# Patient Record
Sex: FEMALE | Race: OTHER | Employment: UNEMPLOYED | URBAN - NONMETROPOLITAN AREA
[De-identification: names, ages, dates, MRNs, and addresses within clinical notes are randomized per-mention and may not be internally consistent; named-entity substitution may affect disease eponyms.]

---
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
## 2022-03-30 ENCOUNTER — HOSPITAL ENCOUNTER (OUTPATIENT)
Age: 22
Setting detail: SPECIMEN
Discharge: HOME | End: 2022-03-30

## 2022-03-30 ENCOUNTER — OFFICE VISIT (OUTPATIENT)
Dept: OBGYN CLINIC | Age: 22
End: 2022-03-30
Payer: COMMERCIAL

## 2022-03-30 VITALS — BODY MASS INDEX: 17.6 KG/M2 | SYSTOLIC BLOOD PRESSURE: 119 MMHG | DIASTOLIC BLOOD PRESSURE: 69 MMHG | WEIGHT: 96.2 LBS

## 2022-03-30 DIAGNOSIS — N94.6 DYSMENORRHEA: Primary | ICD-10-CM

## 2022-03-30 DIAGNOSIS — N94.6 DYSMENORRHEA: ICD-10-CM

## 2022-03-30 PROCEDURE — G8484 FLU IMMUNIZE NO ADMIN: HCPCS | Performed by: NURSE PRACTITIONER

## 2022-03-30 PROCEDURE — 99213 OFFICE O/P EST LOW 20 MIN: CPT | Performed by: NURSE PRACTITIONER

## 2022-03-30 PROCEDURE — G8427 DOCREV CUR MEDS BY ELIG CLIN: HCPCS | Performed by: NURSE PRACTITIONER

## 2022-03-30 PROCEDURE — G8419 CALC BMI OUT NRM PARAM NOF/U: HCPCS | Performed by: NURSE PRACTITIONER

## 2022-03-30 PROCEDURE — 1036F TOBACCO NON-USER: CPT | Performed by: NURSE PRACTITIONER

## 2022-03-30 RX ORDER — MEDROXYPROGESTERONE ACETATE 150 MG/ML
150 INJECTION, SUSPENSION INTRAMUSCULAR ONCE
Qty: 1 ML | Refills: 0 | Status: SHIPPED | OUTPATIENT
Start: 2022-03-30 | End: 2022-06-23

## 2022-03-30 ASSESSMENT — ENCOUNTER SYMPTOMS
SHORTNESS OF BREATH: 0
GASTROINTESTINAL NEGATIVE: 1
CHEST TIGHTNESS: 0

## 2022-03-30 NOTE — PROGRESS NOTES
PROBLEM VISIT     Date of service: 3/30/2022    Eulogio Andrews  Is a 25 y.o. female    PT's PCP is: No primary care provider on file. : 2000                                             Subjective:       Patient's last menstrual period was 2022 (approximate). OB History    Para Term  AB Living   1 1 1     1   SAB IAB Ectopic Molar Multiple Live Births           0 1      # Outcome Date GA Lbr Hugo/2nd Weight Sex Delivery Anes PTL Lv   1 Term 21 39w0d 02:14  00:41 6 lb 8.7 oz (2.968 kg) F Vag-Spont EPI N INDIA        Social History     Tobacco Use   Smoking Status Never Smoker   Smokeless Tobacco Never Used        Social History     Substance and Sexual Activity   Alcohol Use Never       Allergies: Patient has no known allergies. Current Outpatient Medications:     medroxyPROGESTERone (DEPO-PROVERA) 150 MG/ML injection, Inject 1 mL into the muscle once for 1 dose, Disp: 1 mL, Rfl: 0    Social History     Substance and Sexual Activity   Sexual Activity Yes    Partners: Male     Chief Complaint   Patient presents with    Dysmenorrhea     Reports severe cramping with menses- even worse now after pregnancy. Discuss depo. PE:  Vital Signs  Blood pressure 119/69, weight (!) 96 lb 3.2 oz (43.6 kg), last menstrual period 2022, not currently breastfeeding. HPI: Patient presents today with complaints of painful cramping for the first several days of menses since pregnancy. Reports monthly menses. States she is most interested in Depo for cycle control and used Depo briefly in the past.     PT denies fever, chills, nausea and vomiting       Review of Systems   Constitutional: Negative. Respiratory: Negative for chest tightness and shortness of breath. Cardiovascular: Negative for chest pain. Gastrointestinal: Negative. Genitourinary: Positive for menstrual problem. Negative for dysuria, frequency, pelvic pain, vaginal bleeding and vaginal discharge. Neurological: Negative. Psychiatric/Behavioral: Negative. Physical Exam  Constitutional:       Appearance: Normal appearance. HENT:      Head: Normocephalic. Cardiovascular:      Rate and Rhythm: Normal rate. Pulmonary:      Effort: Pulmonary effort is normal.      Breath sounds: Normal breath sounds. Musculoskeletal:         General: Normal range of motion. Neurological:      General: No focal deficit present. Mental Status: She is alert. Psychiatric:         Mood and Affect: Mood normal.         Behavior: Behavior normal.         Assessment and Plan          Diagnosis Orders   1. Dysmenorrhea  hCG, Quantitative, Pregnancy    medroxyPROGESTERone (DEPO-PROVERA) 150 MG/ML injection     Reports painful menses, desires to discuss treatment options. Reviewed options available including OCP's, patch, NuvaRing, Nexplanon, Depo, IUD. Patient desires Depo. She denies risk factors for use. Reviewed risks/benefits, administration, common side effects and bleeding patterns, aches. With negative hcg will return tomorrow for Depo injection. I have discontinued Elisha Valle's Prenatal Vitamin, ferrous sulfate, and norelgestromin-ethinyl estradiol. I am also having her start on medroxyPROGESTERone. Return for Depo tomorrow . There are no Patient Instructions on file for this visit. Over 50% of time spent on counseling and care coordination on: see assessment and plan,  She was also counseled on her preventative health maintenance recommendations and follow-up.         FF time:  20 min      DOROTHY Vidal NP,3/30/2022 2:33 PM

## 2022-03-31 ENCOUNTER — NURSE ONLY (OUTPATIENT)
Dept: OBGYN CLINIC | Age: 22
End: 2022-03-31
Payer: COMMERCIAL

## 2022-03-31 DIAGNOSIS — N94.6 DYSMENORRHEA: Primary | ICD-10-CM

## 2022-03-31 DIAGNOSIS — Z30.42 ENCOUNTER FOR MANAGEMENT AND INJECTION OF DEPO-PROVERA: ICD-10-CM

## 2022-03-31 DIAGNOSIS — N92.6 IRREGULAR MENSES: ICD-10-CM

## 2022-03-31 LAB — HCG QUANTITATIVE: <1 MIU/ML

## 2022-03-31 PROCEDURE — 96372 THER/PROPH/DIAG INJ SC/IM: CPT | Performed by: NURSE PRACTITIONER

## 2022-03-31 RX ORDER — MEDROXYPROGESTERONE ACETATE 150 MG/ML
150 INJECTION, SUSPENSION INTRAMUSCULAR ONCE
Status: COMPLETED | OUTPATIENT
Start: 2022-03-31 | End: 2022-03-31

## 2022-03-31 RX ADMIN — MEDROXYPROGESTERONE ACETATE 150 MG: 150 INJECTION, SUSPENSION INTRAMUSCULAR at 11:37

## 2022-06-07 ENCOUNTER — TELEPHONE (OUTPATIENT)
Dept: OBGYN | Age: 22
End: 2022-06-07

## 2022-06-07 ENCOUNTER — HOSPITAL ENCOUNTER (OUTPATIENT)
Age: 22
Discharge: HOME | End: 2022-06-07
Payer: COMMERCIAL

## 2022-06-07 ENCOUNTER — OFFICE VISIT (OUTPATIENT)
Dept: OBGYN | Age: 22
End: 2022-06-07
Payer: COMMERCIAL

## 2022-06-07 VITALS
WEIGHT: 99 LBS | BODY MASS INDEX: 18.22 KG/M2 | HEIGHT: 62 IN | SYSTOLIC BLOOD PRESSURE: 112 MMHG | DIASTOLIC BLOOD PRESSURE: 66 MMHG

## 2022-06-07 DIAGNOSIS — N92.1 MENORRHAGIA WITH IRREGULAR CYCLE: ICD-10-CM

## 2022-06-07 DIAGNOSIS — N92.1 MENORRHAGIA WITH IRREGULAR CYCLE: Primary | ICD-10-CM

## 2022-06-07 DIAGNOSIS — N92.6 IRREGULAR BLEEDING: Primary | ICD-10-CM

## 2022-06-07 DIAGNOSIS — N92.6 IRREGULAR BLEEDING: ICD-10-CM

## 2022-06-07 LAB — HCG QUANTITATIVE: <1 MIU/ML

## 2022-06-07 PROCEDURE — 36415 COLL VENOUS BLD VENIPUNCTURE: CPT

## 2022-06-07 PROCEDURE — 87591 N.GONORRHOEAE DNA AMP PROB: CPT

## 2022-06-07 PROCEDURE — 1036F TOBACCO NON-USER: CPT | Performed by: ADVANCED PRACTICE MIDWIFE

## 2022-06-07 PROCEDURE — 84702 CHORIONIC GONADOTROPIN TEST: CPT

## 2022-06-07 PROCEDURE — 87491 CHLMYD TRACH DNA AMP PROBE: CPT

## 2022-06-07 PROCEDURE — 99214 OFFICE O/P EST MOD 30 MIN: CPT | Performed by: ADVANCED PRACTICE MIDWIFE

## 2022-06-07 PROCEDURE — G8427 DOCREV CUR MEDS BY ELIG CLIN: HCPCS | Performed by: ADVANCED PRACTICE MIDWIFE

## 2022-06-07 PROCEDURE — G8419 CALC BMI OUT NRM PARAM NOF/U: HCPCS | Performed by: ADVANCED PRACTICE MIDWIFE

## 2022-06-07 NOTE — PROGRESS NOTES
PROBLEM VISIT     Date of service: 2022    Christin Llamas  Is a 25 y.o. single female    PT's PCP is: No primary care provider on file. : 2000                                             Subjective:       No LMP recorded. (Menstrual status: Irregular periods). OB History    Para Term  AB Living   1 1 1     1   SAB IAB Ectopic Molar Multiple Live Births           0 1      # Outcome Date GA Lbr Hugo/2nd Weight Sex Delivery Anes PTL Lv   1 Term 21 39w0d 02:14  00:41 6 lb 8.7 oz (2.968 kg) F Vag-Spont EPI N INDIA        Social History     Tobacco Use   Smoking Status Never Smoker   Smokeless Tobacco Never Used        Social History     Substance and Sexual Activity   Alcohol Use Never       Allergies: Patient has no known allergies. Current Outpatient Medications:     medroxyPROGESTERone (DEPO-PROVERA) 150 MG/ML injection, Inject 1 mL into the muscle once for 1 dose, Disp: 1 mL, Rfl: 0    Social History     Substance and Sexual Activity   Sexual Activity Yes    Partners: Male    Birth control/protection: Injection       Last Yearly:  never    Last pap: never    Last HPV: never    Have you had a positive covid test: Yes    Have you had the covid immunization: Yes    Chief Complaint   Patient presents with    Menstrual Problem     Patient has concerns with irregular bleeding passed  golf ball sized tissue. Abdominal pain. Patient receives Depo Provera, last injection  2022. Patient had negative  serum HCG today. PE:  Vital Signs  Blood pressure 112/66, height 5' 2\" (1.575 m), weight (!) 99 lb (44.9 kg), not currently breastfeeding. Estimated body mass index is 18.11 kg/m² as calculated from the following:    Height as of this encounter: 5' 2\" (1.575 m). Weight as of this encounter: 99 lb (44.9 kg). No data recorded    NURSE:CLAUS Orta LPN    HPI: patient is scheduled as yearly exam but actually here for irregular bleeding on depo provera, passed large clot     PT denies fever, chills, nausea and vomiting       Objective   No acute distress  Excellent communications  Well-nourished  Lymphatic:   no lymphadenopathy   GI: Abdomen soft, non-tender. BS normal. No masses,  No organomegaly           Extremities: normal strength, tone, and muscle mass      Pelvic Exam: GENITAL/URINARY:  External Genitalia:  General appearance; normal, Hair distribution; normal, Lesions absent  Urethral Meatus:  Size normal, Location normal, Lesions absent, Prolapse absent  Urethra: Fullness absent, Masses absent  Bladder:  Fullness absent, Masses absent, Tenderness absent, Cystocele absent  Vagina:  General appearance normal, Estrogen effect normal, Discharge old blood in vault, Lesions absent, Pelvic support normal  Cervix:  General appearance normal, Lesions absent, Discharge absent, Tenderness absent, Enlargement absent, Nodularity absent  Uterus:  Size normal, Tenderness absent  Adenexa: Masses absent, Tenderness absent  Anus/Perineum:  Lesions absent and Masses absent                                                       Results reviewed today:    No results found for this visit on 06/07/22. Assessment and Plan          Diagnosis Orders   1. Menorrhagia with irregular cycle  C.trachomatis N.gonorrhoeae DNA    US NON OB TRANSVAGINAL       await cultures and sono, continue depo therapy dependent on results      I am having Elisha JOINER Chel Story maintain her medroxyPROGESTERone. Return for gyn US for heavy bleeding asap here in office. There are no Patient Instructions on file for this visit.     Time spent 30 minutes      Julio Cesar Salguero, [address] 10:54 AM

## 2022-06-08 LAB
C TRACH DNA GENITAL QL NAA+PROBE: NEGATIVE
N. GONORRHOEAE DNA: NEGATIVE
SPECIMEN DESCRIPTION: NORMAL

## 2022-06-23 ENCOUNTER — NURSE ONLY (OUTPATIENT)
Dept: OBGYN CLINIC | Age: 22
End: 2022-06-23
Payer: COMMERCIAL

## 2022-06-23 DIAGNOSIS — N94.6 DYSMENORRHEA: ICD-10-CM

## 2022-06-23 DIAGNOSIS — N92.6 IRREGULAR MENSES: Primary | ICD-10-CM

## 2022-06-23 PROCEDURE — 96372 THER/PROPH/DIAG INJ SC/IM: CPT | Performed by: NURSE PRACTITIONER

## 2022-06-23 RX ORDER — MEDROXYPROGESTERONE ACETATE 150 MG/ML
150 INJECTION, SUSPENSION INTRAMUSCULAR ONCE
Status: COMPLETED | OUTPATIENT
Start: 2022-06-23 | End: 2022-06-23

## 2022-06-23 RX ORDER — MEDROXYPROGESTERONE ACETATE 150 MG/ML
150 INJECTION, SUSPENSION INTRAMUSCULAR ONCE
Qty: 1 ML | Refills: 0 | Status: SHIPPED | OUTPATIENT
Start: 2022-06-23 | End: 2022-06-30

## 2022-06-23 RX ADMIN — MEDROXYPROGESTERONE ACETATE 150 MG: 150 INJECTION, SUSPENSION INTRAMUSCULAR at 13:00

## 2022-06-30 ENCOUNTER — OFFICE VISIT (OUTPATIENT)
Dept: OBGYN | Age: 22
End: 2022-06-30
Payer: COMMERCIAL

## 2022-06-30 VITALS
DIASTOLIC BLOOD PRESSURE: 60 MMHG | WEIGHT: 98.6 LBS | BODY MASS INDEX: 18.14 KG/M2 | HEIGHT: 62 IN | SYSTOLIC BLOOD PRESSURE: 116 MMHG

## 2022-06-30 DIAGNOSIS — N94.89 PELVIC CONGESTION: ICD-10-CM

## 2022-06-30 DIAGNOSIS — Q50.5 PARA-OVARIAN CYST: Primary | ICD-10-CM

## 2022-06-30 PROCEDURE — 1036F TOBACCO NON-USER: CPT | Performed by: ADVANCED PRACTICE MIDWIFE

## 2022-06-30 PROCEDURE — 99212 OFFICE O/P EST SF 10 MIN: CPT | Performed by: ADVANCED PRACTICE MIDWIFE

## 2022-06-30 PROCEDURE — G8419 CALC BMI OUT NRM PARAM NOF/U: HCPCS | Performed by: ADVANCED PRACTICE MIDWIFE

## 2022-06-30 PROCEDURE — G8427 DOCREV CUR MEDS BY ELIG CLIN: HCPCS | Performed by: ADVANCED PRACTICE MIDWIFE

## 2022-06-30 NOTE — PROGRESS NOTES
PROBLEM VISIT     Date of service: 2022    Carlos Keller  Is a 25 y.o. single female    PT's PCP is: No primary care provider on file. : 2000                                             Subjective:       No LMP recorded (lmp unknown). (Menstrual status: Irregular periods). OB History    Para Term  AB Living   1 1 1     1   SAB IAB Ectopic Molar Multiple Live Births           0 1      # Outcome Date GA Lbr Hugo/2nd Weight Sex Delivery Anes PTL Lv   1 Term 21 39w0d 02:14  00:41 6 lb 8.7 oz (2.968 kg) F Vag-Spont EPI N [place]IA        Social History     Tobacco Use   Smoking Status Never Smoker   Smokeless Tobacco Never Used        Social History     Substance and Sexual Activity   Alcohol Use Never       Social History     Substance and Sexual Activity   Sexual Activity Yes    Partners: Male    Birth control/protection: Injection       Allergies: Patient has no known allergies. Chief Complaint   Patient presents with    Menstrual Problem     Follow up in house ultrasound, h/o heavy bleeding with clots. Patient recies Depo Provera. Last Yearly:  never    Last pap: never    Last HPV: never    Have you had a positive covid test: Yes    Have you had the covid immunization: Yes    PE:  Vital Signs  Blood pressure 116/60, height 5' 2\" (1.575 m), weight 98 lb 9.6 oz (44.7 kg), not currently breastfeeding. Estimated body mass index is 18.03 kg/m² as calculated from the following:    Height as of this encounter: 5' 2\" (1.575 m). Weight as of this encounter: 98 lb 9.6 oz (44.7 kg). No data recorded      NURSE: Negrita SILVA    HPI: patient is being w/u for heavy bleeding while on depo      PT denies fever, chills, nausea and vomiting       Objective: No acute distress  Excellent communications  Well-nourished    Results reviewed today:  Sono:  UTERUS:anteverted, homogeneous echo pattern; hypervascular uterus        ENDO:5mm in thickness       RT.  OVARY:seen, follicles visualized        LT. OVARY:seen, follicles visualized - para ovarian cysts visualized in    left adnexa    Finding 1 bob- 1.6cm x 1.5cm x 1.7cm   Finding 2 bob- 0.9cm x 0.9cm x 1.3cm       Prominent vessels visualized in uterus and out to both adnexal regions-    suggestive of pelvic congestion        No results found for this visit on 06/30/22. Assessment and Plan          Diagnosis Orders   1. Para-ovarian cyst     2. Pelvic congestion       Will continue depo and f/u paraovarian cyst / surveil with sono        I am having Elisha Altamirano maintain her medroxyPROGESTERone. No follow-ups on file. There are no Patient Instructions on file for this visit.     Time spent 20 minutes      DOROTHY Tello CNM,7/1/2022 4:31 PM

## 2022-09-15 ENCOUNTER — TELEPHONE (OUTPATIENT)
Dept: OBGYN CLINIC | Age: 22
End: 2022-09-15

## 2022-09-17 NOTE — TELEPHONE ENCOUNTER
Do we know what is going on here? Is the patient going to change and go to the [facility] office?

## 2023-02-02 ENCOUNTER — TELEPHONE (OUTPATIENT)
Dept: OBGYN | Age: 23
End: 2023-02-02

## 2023-02-02 NOTE — TELEPHONE ENCOUNTER
Patient calls with concerns of feeling very tired but also feels dizzy and blurred vision off and on. Patient is very early pregnant LMP first week of January. Advised patient to proceed to ED for further evaluation, patient voiced understanding.

## 2023-03-01 ENCOUNTER — INITIAL PRENATAL (OUTPATIENT)
Dept: OBGYN | Age: 23
End: 2023-03-01

## 2023-03-01 ENCOUNTER — HOSPITAL ENCOUNTER (OUTPATIENT)
Age: 23
Setting detail: SPECIMEN
Discharge: HOME | End: 2023-03-01
Payer: COMMERCIAL

## 2023-03-01 ENCOUNTER — ANCILLARY PROCEDURE (OUTPATIENT)
Dept: OBGYN | Age: 23
End: 2023-03-01
Payer: COMMERCIAL

## 2023-03-01 VITALS — SYSTOLIC BLOOD PRESSURE: 100 MMHG | BODY MASS INDEX: 18.47 KG/M2 | WEIGHT: 101 LBS | DIASTOLIC BLOOD PRESSURE: 64 MMHG

## 2023-03-01 DIAGNOSIS — Z32.01 POSITIVE PREGNANCY TEST: ICD-10-CM

## 2023-03-01 DIAGNOSIS — Z34.81 ENCOUNTER FOR SUPERVISION OF OTHER NORMAL PREGNANCY IN FIRST TRIMESTER: Primary | ICD-10-CM

## 2023-03-01 DIAGNOSIS — N91.2 AMENORRHEA: ICD-10-CM

## 2023-03-01 DIAGNOSIS — Z32.01 POSITIVE URINE PREGNANCY TEST: ICD-10-CM

## 2023-03-01 DIAGNOSIS — Z34.81 ENCOUNTER FOR SUPERVISION OF OTHER NORMAL PREGNANCY IN FIRST TRIMESTER: ICD-10-CM

## 2023-03-01 DIAGNOSIS — O36.80X0 ENCOUNTER TO DETERMINE FETAL VIABILITY OF PREGNANCY, SINGLE OR UNSPECIFIED FETUS: ICD-10-CM

## 2023-03-01 DIAGNOSIS — Z3A.08 8 WEEKS GESTATION OF PREGNANCY: ICD-10-CM

## 2023-03-01 LAB
ABO/RH: NORMAL
ABSOLUTE EOS #: 0.06 K/UL (ref 0–0.44)
ABSOLUTE IMMATURE GRANULOCYTE: <0.03 K/UL (ref 0–0.3)
ABSOLUTE LYMPH #: 1.83 K/UL (ref 1.2–5.2)
ABSOLUTE MONO #: 0.56 K/UL (ref 0.1–1.4)
AMPHETAMINE SCREEN URINE: NEGATIVE
ANTIBODY SCREEN: NEGATIVE
BARBITURATE SCREEN URINE: NEGATIVE
BASOPHILS # BLD: 0 % (ref 0–2)
BASOPHILS ABSOLUTE: 0.03 K/UL (ref 0–0.2)
BENZODIAZEPINE SCREEN, URINE: NEGATIVE
BUPRENORPHINE URINE: NEGATIVE
CANNABINOID SCREEN URINE: NEGATIVE
COCAINE METABOLITE, URINE: NEGATIVE
CONTROL: PRESENT
EOSINOPHILS RELATIVE PERCENT: 1 % (ref 1–4)
FENTANYL URINE: NEGATIVE
HBV SURFACE AG SER QL: NONREACTIVE
HCT VFR BLD AUTO: 38.7 % (ref 36.3–47.1)
HCV AB SER QL: NONREACTIVE
HGB BLD-MCNC: 13.2 G/DL (ref 11.9–15.1)
HIV 1+2 AB+HIV1 P24 AG SERPL QL IA: NONREACTIVE
IMMATURE GRANULOCYTES: 0 %
LYMPHOCYTES # BLD: 26 % (ref 25–45)
MCH RBC QN AUTO: 30.3 PG (ref 25.2–33.5)
MCHC RBC AUTO-ENTMCNC: 34.1 G/DL (ref 28.4–34.8)
MCV RBC AUTO: 88.8 FL (ref 82.6–102.9)
METHADONE SCREEN, URINE: NEGATIVE
MONOCYTES # BLD: 8 % (ref 2–8)
NRBC AUTOMATED: 0 PER 100 WBC
OPIATES, URINE: NEGATIVE
OXYCODONE SCREEN URINE: NEGATIVE
PDW BLD-RTO: 12.2 % (ref 11.8–14.4)
PHENCYCLIDINE, URINE: NEGATIVE
PLATELET # BLD AUTO: 316 K/UL (ref 138–453)
PMV BLD AUTO: 10.6 FL (ref 8.1–13.5)
PREGNANCY TEST URINE, POC: POSITIVE
RBC # BLD: 4.36 M/UL (ref 3.95–5.11)
RUBV IGG SER QL: 270.2 IU/ML
SEG NEUTROPHILS: 65 % (ref 34–64)
SEGMENTED NEUTROPHILS ABSOLUTE COUNT: 4.63 K/UL (ref 1.8–8)
T PALLIDUM AB SER QL IA: NONREACTIVE
WBC # BLD AUTO: 7.1 K/UL (ref 4.5–13.5)

## 2023-03-01 PROCEDURE — 86762 RUBELLA ANTIBODY: CPT

## 2023-03-01 PROCEDURE — 86901 BLOOD TYPING SEROLOGIC RH(D): CPT

## 2023-03-01 PROCEDURE — 87591 N.GONORRHOEAE DNA AMP PROB: CPT

## 2023-03-01 PROCEDURE — 80306 DRUG TEST PRSMV INSTRMNT: CPT

## 2023-03-01 PROCEDURE — 87340 HEPATITIS B SURFACE AG IA: CPT

## 2023-03-01 PROCEDURE — 86900 BLOOD TYPING SEROLOGIC ABO: CPT

## 2023-03-01 PROCEDURE — 86850 RBC ANTIBODY SCREEN: CPT

## 2023-03-01 PROCEDURE — 86780 TREPONEMA PALLIDUM: CPT

## 2023-03-01 PROCEDURE — 85025 COMPLETE CBC W/AUTO DIFF WBC: CPT

## 2023-03-01 PROCEDURE — 87389 HIV-1 AG W/HIV-1&-2 AB AG IA: CPT

## 2023-03-01 PROCEDURE — 86803 HEPATITIS C AB TEST: CPT

## 2023-03-01 PROCEDURE — 87491 CHLMYD TRACH DNA AMP PROBE: CPT

## 2023-03-01 PROCEDURE — 87086 URINE CULTURE/COLONY COUNT: CPT

## 2023-03-01 NOTE — PATIENT INSTRUCTIONS
Patient Education        Learning About Pregnancy  Your Care Instructions     Your health in the early weeks of your pregnancy is particularly important for your baby's health. Take good care of yourself. Anything you do that harms your body can also harm your baby. Make sure to go to all of your doctor appointments. Regular checkups will help keep you and your baby healthy. How can you care for yourself at home? Diet    Eat a balanced diet. Make sure your diet includes plenty of beans, peas, and leafy green vegetables. Do not skip meals or go for many hours without eating. If you are nauseated, try to eat a small, healthy snack every 2 to 3 hours. Do not eat fish that has a high level of mercury, such as shark, swordfish, or mackerel. Do not eat more than one can of tuna each week. Drink plenty of fluids. If you have kidney, heart, or liver disease and have to limit fluids, talk with your doctor before you increase the amount of fluids you drink. Cut down on caffeine, such as coffee, tea, and cola. Do not drink alcohol, such as beer, wine, or hard liquor. Take a multivitamin that contains at least 400 micrograms (mcg) of folic acid to help prevent birth defects. Fortified cereal and whole wheat bread are good additional sources of folic acid. Increase the calcium in your diet. Try to drink a quart of skim milk each day. You may also take calcium supplements and choose foods such as cheese and yogurt. Lifestyle    Make sure you go to your follow-up appointments. Get plenty of rest. You may be unusually tired while you are pregnant. Get at least 30 minutes of exercise on most days of the week. Walking is a good choice. If you have not exercised in the past, start out slowly. Take several short walks each day. Do not smoke. If you need help quitting, talk to your doctor about stop-smoking programs. These can increase your chances of quitting for good.      Do not touch cat feces or litter boxes. Also, wash your hands after you handle raw meat, and fully cook all meat before you eat it. Wear gloves when you work in the yard or garden, and wash your hands well when you are done. Cat feces, raw or undercooked meat, and contaminated dirt can cause an infection that may harm your baby or lead to a miscarriage. Avoid things that can make your body too hot and may be harmful to your baby, such as a hot tub or sauna. Or talk with your doctor before doing anything that raises your body temperature. Your doctor can tell you if it's safe. Avoid chemical fumes, paint fumes, or poisons. Do not use illegal drugs, marijuana, or alcohol. Medicines    Review all of your medicines with your doctor. Some of your routine medicines may need to be changed to protect your baby. Use acetaminophen (Tylenol) to relieve minor problems, such as a mild headache or backache or a mild fever with cold symptoms. Do not use nonsteroidal anti-inflammatory drugs (NSAIDs), such as ibuprofen (Advil, Motrin) or naproxen (Aleve), unless your doctor says it is okay. Do not take two or more pain medicines at the same time unless the doctor told you to. Many pain medicines have acetaminophen, which is Tylenol. Too much acetaminophen (Tylenol) can be harmful. Take your medicines exactly as prescribed. Call your doctor if you think you are having a problem with your medicine. To manage morning sickness    If you feel sick when you first wake up, try eating a small snack (such as crackers) before you get out of bed. Allow some time to digest the snack, and then get out of bed slowly. Do not skip meals or go for long periods without eating. An empty stomach can make nausea worse. Eat small, frequent meals instead of three large meals each day. Drink plenty of fluids. Eat foods that are high in protein but low in fat.      If you are taking iron supplements, ask your doctor if they are necessary. Iron can make nausea worse. Avoid any smells, such as coffee, that make you feel sick. Get lots of rest. Morning sickness may be worse when you are tired. Follow-up care is a key part of your treatment and safety. Be sure to make and go to all appointments, and call your doctor if you are having problems. It's also a good idea to know your test results and keep a list of the medicines you take. Where can you learn more? Go to http://www.woods.com/ and enter S915 to learn more about \"Learning About Pregnancy. \"  Current as of: February 23, 2022               Content Version: 13.5  © 6758-0169 [employer]. Care instructions adapted under license by [facility] ([facility]). If you have questions about a medical condition or this instruction, always ask your healthcare professional. [address] any warranty or liability for your use of this information. Patient Education        Managing Morning Sickness: Care Instructions  Overview     Morning sickness can be the toughest part of early pregnancy. Some people feel mildly sick to their stomach, and others are running to the bathroom. The good news? Morning sickness usually gets better in the second trimester. It's likely that your hormones are to blame for morning sickness. But you can do things to feel better, like changing what you eat, avoiding certain foods and smells, and asking your doctor about medicines you can try. Follow-up care is a key part of your treatment and safety. Be sure to make and go to all appointments, and call your doctor if you are having problems. It's also a good idea to know your test results and keep a list of the medicines you take. How can you care for yourself at home? Keep food in your stomach, but not too much at once. Your nausea may be worse if your stomach is empty. Eat five or six small meals a day instead of three large meals.   For morning nausea, eat a small snack, such as a couple of crackers or dry biscuits, before rising. Allow a few minutes for your stomach to settle before you get out of bed slowly. Drink plenty of fluids. If you have kidney, heart, or liver disease and have to limit fluids, talk with your doctor before you increase the amount of fluids you drink. Some women find that peppermint tea helps with nausea. Eat more protein, such as chicken, fish, lean meat, beans, nuts, and seeds. Eat carbohydrate foods, such as potatoes, whole-grain cereals, rice, and pasta. Avoid smells and foods that make you feel nauseated. Spicy or high-fat foods, citrus juice, milk, coffee, and tea with caffeine often make nausea worse. Do not drink alcohol. Do not smoke. Try not to be around others who smoke. If you need help quitting, talk to your doctor about stop-smoking programs and medicines. These can increase your chances of quitting for good. If you are taking iron supplements, ask your doctor if they are necessary. Iron can make nausea worse. Get lots of rest. Stress and fatigue can make your morning sickness worse. Ask your doctor about taking prescription medicine, or over-the-counter products such as vitamin B6, doxylamine, or manuel, to relieve your symptoms. Your doctor can tell you the doses that are safe for you. Take your prenatal vitamins at night on a full stomach. When should you call for help? Call 911 anytime you think you may need emergency care. For example, call if:    You passed out (lost consciousness). Call your doctor now or seek immediate medical care if:    You are sick to your stomach or cannot drink fluids. You have symptoms of dehydration, such as:  Dry eyes and a dry mouth. Passing only a little urine. Feeling thirstier than usual.     You are not able to keep down your medicine. You have pain in your belly or pelvis.    Watch closely for changes in your health, and be sure to contact your doctor if:    You do not get better as expected. Where can you learn more? Go to http://www.amanda.com/ and enter W450 to learn more about \"Managing Morning Sickness: Care Instructions. \"  Current as of: February 23, 2022               Content Version: 13.5  © 2006-2022 Healthwise, [employer]. Care instructions adapted under license by [facility] ([facility]). If you have questions about a medical condition or this instruction, always ask your healthcare professional. [address] any warranty or liability for your use of this information. Patient Education        Nutrition During Pregnancy: Care Instructions  Overview     Healthy eating when you are pregnant is important for you and your baby. It can help you feel well and have a successful pregnancy and delivery. During pregnancy your nutrition needs increase. Even if you have excellent eating habits, your doctor may recommend a multivitamin to make sure you get enough iron and folic acid. You may wonder how much weight you should gain. In general, if you were at a healthy weight before you became pregnant, then you should gain between 25 and 35 pounds. If you were overweight before pregnancy, then you'll likely be advised to gain 15 to 25 pounds. If you were underweight before pregnancy, then you'll probably be advised to gain 28 to 40 pounds. Your doctor will work with you to set a weight goal that is right for you. Gaining a healthy amount of weight helps you have a healthy baby. Follow-up care is a key part of your treatment and safety. Be sure to make and go to all appointments, and call your doctor if you are having problems. It's also a good idea to know your test results and keep a list of the medicines you take. How can you care for yourself at home? Eat plenty of fruits and vegetables. Include a variety of orange, yellow, and leafy dark-green vegetables every day. Choose whole-grain bread, cereal, and pasta.  Good choices include whole wheat bread, whole wheat pasta, brown rice, and oatmeal.  Get 4 or more servings of milk and milk products each day. Good choices include nonfat or low-fat milk, yogurt, and cheese. If you cannot eat milk products, you can get calcium from calcium-fortified products such as orange juice, soy milk, and tofu. Other non-milk sources of calcium include leafy green vegetables, such as broccoli, kale, mustard greens, turnip greens, bok dara, and brussels sprouts. If you eat meat, pick lower-fat types. Good choices include lean cuts of meat and chicken or turkey without the skin. Do not eat shark, swordfish, aries mackerel, or tilefish. They have high levels of mercury, which is dangerous to your baby. You can eat up to 12 ounces a week of fish or shellfish that have low mercury levels. Good choices include shrimp, wild salmon, pollock, and catfish. Limit some other types of fish, such as white (albacore) tuna, to 4 oz (0.1 kg) a week. Heat lunch meats (such as turkey, ham, or bologna) to 165°F before you eat them. This reduces your risk of getting sick from a kind of bacteria that can be found in lunch meats. Do not eat unpasteurized soft cheeses, such as brie, feta, fresh mozzarella, and blue cheese. They have a bacteria that could harm your baby. Limit caffeine. If you drink coffee or tea, have no more than 1 cup a day. Caffeine is also found in titus. Do not drink any alcohol. No amount of alcohol has been found to be safe during pregnancy. Do not diet or try to lose weight. For example, do not follow a low-carbohydrate diet. If you are overweight at the start of your pregnancy, your doctor will work with you to manage your weight gain. Tell your doctor about all vitamins and supplements you take. When should you call for help? Watch closely for changes in your health, and be sure to contact your doctor if you have any problems. Where can you learn more?   Go to http://www.woods.com/ and enter Y785 to learn more about \"Nutrition During Pregnancy: Care Instructions. \"  Current as of: May 9, 2022               Content Version: 13.5  © 2260-8453 [employer]. Care instructions adapted under license by [facility] ([facility]). If you have questions about a medical condition or this instruction, always ask your healthcare professional. [address] any warranty or liability for your use of this information. Patient Education        Weeks 6 to 10 of Your Pregnancy: Care Instructions  Your baby is growing very quickly. You may start to feel different, both in your body and your emotions. Each pregnancy is different, so there's no \"right\" way to feel. These early weeks are a time to make healthy choices for you and your baby. Take a daily prenatal vitamin. Choose one with folic acid in it. Avoid alcohol, tobacco, and drugs (including marijuana). They can harm your baby. Drink plenty of liquids. Be sure to drink enough water. And limit sodas, other sweetened drinks, and caffeine. Choose foods that are good sources of calcium, iron, and folate. You can try dark leafy greens, fortified orange juice and cereals, almonds, broccoli, dried fruit, and beans. Avoid foods that may harm your baby. Don't eat raw meat, deli meat, raw seafood, or raw eggs. Avoid soft cheese and unpasteurized dairy, like Brie and blue cheese. And don't eat fish that contains a lot of mercury, like shark and swordfish. Don't touch rajendra litter or cat poop. They can cause an infection that could harm your baby. Avoid things that can make your body too hot. For example, avoid hot tubs and saunas. Soothe morning sickness. Try eating 5 or 6 small meals a day, getting some fresh air, or using manuel to control symptoms. Follow-up care is a key part of your treatment and safety. Be sure to make and go to all appointments, and call your doctor if you are having problems.  It's also a good idea to know your test results and keep a list of the medicines you take. Where can you learn more? Go to http://www.woods.com/ and enter G112 to learn more about \"Weeks 6 to 10 of Your Pregnancy: Care Instructions. \"  Current as of: 2022               Content Version: 13.5   Healthwise, [employer]. Care instructions adapted under license by [facility] ([facility]). If you have questions about a medical condition or this instruction, always ask your healthcare professional. [address] any warranty or liability for your use of this information. Learn and GO with Manuel              Scan the QR Code  below to access   parent education powered  by [employer]. [employer] gives you access to:     Prenatal   Labor and birth   Postpartum care   Breastfeeding   Printer care  Scan the QR code to access  [employer] at [address] [facility] has many class options to meet the needs of growing families! Prenatal and  class offerings     Special Delivery - This prenatal childbirth class covers preparation for birth, labor, vaginal and  delivery, recovery, and postpartum.   9 am - 3 pm   &  5:30 pm - 8:30 pm    9 am - 3 pm   &   5:30 pm - 8:30 pm  May 6   9 am - 3 pm   &   5:30 pm - 8:30 pm     9 am - 3 pm   &  5:30 pm - 8:30 pm    9 am - 3 pm   & 15 5:30 pm - 8:30 pm    Welcome, Little One! - This  care class covers  characteristics, testing, and basic care of your . Information on your [address] and other support services is also offered during class.   5:30pm - 7:30 pm    5:30pm - 7:30 pm    5:30pm - 7:30 pm    5:30pm - 7:30 pm    5:30pm - 7:30 pm    5:30pm - 7:30 pm    There is no charge for classes.   To register for classes call  Obstetrics Department at                 [address] [place] [address]? Breastfeeding Basics - This class covers preparation for breastfeeding, the basics of milk production, breastfeeding in the first few weeks, and breast pump basics. January 26  5:30 pm - 7:30 pm  March 21  5:30 pm - 7:30 pm  May 9   5:30 pm - 7:30 pm  July 17   5:30 pm - 7:30 pm  September 18  5:30 pm - 7:30 pm  November 29  5:30 pm - 7:30 pm    Your Sensational Baby - Babies experience life with all their senses. Infant massage helps infants grow and thrive. This class teaches adult caregivers of infants the basics of massaging your baby. This 45-60-minute class is offered in a five-week series (plan to attend all five classes with your baby). March 1, 8, 15, 22, 29  11 am - noon  June 16, 22, 29, July 6, 12   11 am - noon  October 3, 10, 17, 24, 31 11 am - noon    Isn't It Willis? - Grandparents or other special adults are welcome to attend this session to discuss changes in childbirth, parenting philosophies, and how to encourage and support the parents and babies that we love. March 9  6 pm - 7 pm  May 22              6 pm - 7 pm  August 8  6 pm - 7 pm  October 19  6 pm - 7 pm    Join us on the third Thursday of each month at 10:30 am for Spilled Milk - feeding support group! Infant scale will be available for weight check or weighted feedings! Group is led by a breastfeeding expert, but [id number] support is encouraged. [employer] has many class options to meet the needs of growing families! Spilled Milk   No registration required. When:  Third Thursday of each month at 10:30 am  Where: [address] [facility] meeting room at [employer] (near [facility] [address], [place], [place]  Who:  Breastfeeding parents, exclusive pumping parents, formula feeding parents. Come, bring your baby, network with other parents, and tap into some expert assistance!  We will have a monthly starter topic, but discussion will be flexible based on participant needs! Infant scales will be available for infant weights or weighted feedings!     Led by: Guillermo Johnson MSN RN IBCLC  [phone] l

## 2023-03-01 NOTE — PROGRESS NOTES
New OB Visit    Date of service: 3/1/2023    Lashon Werner  Is a 23 y.o. single female presenting for a New OB visit with Nurse. Name of Father of Almaz Neri is Carmen Tolbert and is involved. Pt does not work. Pt is not Fertility pt. PT's PCP is: No primary care provider on file. : 2000                                             Subjective:       Patient's last menstrual period was 2023. OB History    Para Term  AB Living   2 1 1     1   SAB IAB Ectopic Molar Multiple Live Births           0 1      # Outcome Date GA Lbr Hugo/2nd Weight Sex Delivery Anes PTL Lv   2 Current            1 Term 23 39w0d 02:14 / 00:41 6 lb 8.7 oz (2.968 kg) F Vag-Spont EPI N IND[place]             Social History     Tobacco Use   Smoking Status Never   Smokeless Tobacco Never        Social History     Substance and Sexual Activity   Alcohol Use Never        Allergies: Patient has no known allergies. Current Outpatient Medications:     Prenatal MV-Min-Fe Fum-FA-DHA (PRENATAL 1 PO), Take by mouth, Disp: , Rfl:     medroxyPROGESTERone (DEPO-PROVERA) 150 MG/ML injection, INJECT 1 ML INTO THE MUSCLE ONCE FOR 1 DOSE, Disp: 1 mL, Rfl: 0      Vital Signs Blood pressure 100/64, weight 101 lb (45.8 kg), last menstrual period 2023, not currently breastfeeding. Results for orders placed or performed in visit on 23   POCT urine pregnancy   Result Value Ref Range    Preg Test, Ur positive     Control present          Pain: some cramping                            Nausea: yes      Vomiting: no        Breast enlargement or tenderness: yes    Frequency of urination:yes      Fatigue: yes         Patient history reviewed. Educational materials given and genetic testing reviewed.       Breastfeeding handouts given and reviewed: Yes     If BMI over 35 was HgA1C drawn: N/A     Does pt have a history gestational diabetes :  N/A   If yes did we draw HgA1C: N/A     If history of thyroid problem was TSH drawn: N/A       My chart set up and activated: Yes    If history of preeclampsia did we start baby ASA: N/A    If history of  delivery - did we set up progesterone injections:  N/A    Does pt have a history of DVT? no  If yes start Lovenox 40 mg daily    Is pt allergic to PCN? No:   If yes order U/A to culture and sensitivity if positive. Education:  Patient Instructions   Patient Education       Learning About Pregnancy  Your Care Instructions     Your health in the early weeks of your pregnancy is particularly important for your baby's health. Take good care of yourself. Anything you do that harms your body can also harm your baby. Make sure to go to all of your doctor appointments. Regular checkups will help keep you and your baby healthy. How can you care for yourself at home? Diet    Eat a balanced diet. Make sure your diet includes plenty of beans, peas, and leafy green vegetables. Do not skip meals or go for many hours without eating. If you are nauseated, try to eat a small, healthy snack every 2 to 3 hours. Do not eat fish that has a high level of mercury, such as shark, swordfish, or mackerel. Do not eat more than one can of tuna each week. Drink plenty of fluids. If you have kidney, heart, or liver disease and have to limit fluids, talk with your doctor before you increase the amount of fluids you drink. Cut down on caffeine, such as coffee, tea, and cola. Do not drink alcohol, such as beer, wine, or hard liquor. Take a multivitamin that contains at least 400 micrograms (mcg) of folic acid to help prevent birth defects. Fortified cereal and whole wheat bread are good additional sources of folic acid. Increase the calcium in your diet. Try to drink a quart of skim milk each day. You may also take calcium supplements and choose foods such as cheese and yogurt. Lifestyle    Make sure you go to your follow-up appointments.      Get plenty of rest. You may be unusually tired while you are pregnant. Get at least 30 minutes of exercise on most days of the week. Walking is a good choice. If you have not exercised in the past, start out slowly. Take several short walks each day. Do not smoke. If you need help quitting, talk to your doctor about stop-smoking programs. These can increase your chances of quitting for good. Do not touch cat feces or litter boxes. Also, wash your hands after you handle raw meat, and fully cook all meat before you eat it. Wear gloves when you work in the yard or garden, and wash your hands well when you are done. Cat feces, raw or undercooked meat, and contaminated dirt can cause an infection that may harm your baby or lead to a miscarriage. Avoid things that can make your body too hot and may be harmful to your baby, such as a hot tub or sauna. Or talk with your doctor before doing anything that raises your body temperature. Your doctor can tell you if it's safe. Avoid chemical fumes, paint fumes, or poisons. Do not use illegal drugs, marijuana, or alcohol. Medicines    Review all of your medicines with your doctor. Some of your routine medicines may need to be changed to protect your baby. Use acetaminophen (Tylenol) to relieve minor problems, such as a mild headache or backache or a mild fever with cold symptoms. Do not use nonsteroidal anti-inflammatory drugs (NSAIDs), such as ibuprofen (Advil, Motrin) or naproxen (Aleve), unless your doctor says it is okay. Do not take two or more pain medicines at the same time unless the doctor told you to. Many pain medicines have acetaminophen, which is Tylenol. Too much acetaminophen (Tylenol) can be harmful. Take your medicines exactly as prescribed. Call your doctor if you think you are having a problem with your medicine. To manage morning sickness    If you feel sick when you first wake up, try eating a small snack (such as crackers) before you get out of bed. Allow some time to digest the snack, and then get out of bed slowly. Do not skip meals or go for long periods without eating. An empty stomach can make nausea worse. Eat small, frequent meals instead of three large meals each day. Drink plenty of fluids. Eat foods that are high in protein but low in fat. If you are taking iron supplements, ask your doctor if they are necessary. Iron can make nausea worse. Avoid any smells, such as coffee, that make you feel sick. Get lots of rest. Morning sickness may be worse when you are tired. Follow-up care is a key part of your treatment and safety. Be sure to make and go to all appointments, and call your doctor if you are having problems. It's also a good idea to know your test results and keep a list of the medicines you take. Where can you learn more? Go to http://www.woods.com/ and enter W845 to learn more about \"Learning About Pregnancy. \"  Current as of: February 23, 2022               Content Version: 13.5  © 3153-7417 [employer]. Care instructions adapted under license by [facility] ([facility]). If you have questions about a medical condition or this instruction, always ask your healthcare professional. [place] [address] any warranty or liability for your use of this information. Patient Education        Managing Morning Sickness: Care Instructions  Overview     Morning sickness can be the toughest part of early pregnancy. Some people feel mildly sick to their stomach, and others are running to the bathroom. The good news? Morning sickness usually gets better in the second trimester. It's likely that your hormones are to blame for morning sickness. But you can do things to feel better, like changing what you eat, avoiding certain foods and smells, and asking your doctor about medicines you can try. Follow-up care is a key part of your treatment and safety.  Be sure to make and go to all appointments, and call your doctor if you are having problems. It's also a good idea to know your test results and keep a list of the medicines you take. How can you care for yourself at home? Keep food in your stomach, but not too much at once. Your nausea may be worse if your stomach is empty. Eat five or six small meals a day instead of three large meals. For morning nausea, eat a small snack, such as a couple of crackers or dry biscuits, before rising. Allow a few minutes for your stomach to settle before you get out of bed slowly. Drink plenty of fluids. If you have kidney, heart, or liver disease and have to limit fluids, talk with your doctor before you increase the amount of fluids you drink. Some women find that peppermint tea helps with nausea. Eat more protein, such as chicken, fish, lean meat, beans, nuts, and seeds. Eat carbohydrate foods, such as potatoes, whole-grain cereals, rice, and pasta. Avoid smells and foods that make you feel nauseated. Spicy or high-fat foods, citrus juice, milk, coffee, and tea with caffeine often make nausea worse. Do not drink alcohol. Do not smoke. Try not to be around others who smoke. If you need help quitting, talk to your doctor about stop-smoking programs and medicines. These can increase your chances of quitting for good. If you are taking iron supplements, ask your doctor if they are necessary. Iron can make nausea worse. Get lots of rest. Stress and fatigue can make your morning sickness worse. Ask your doctor about taking prescription medicine, or over-the-counter products such as vitamin B6, doxylamine, or manuel, to relieve your symptoms. Your doctor can tell you the doses that are safe for you. Take your prenatal vitamins at night on a full stomach. When should you call for help? Call 911 anytime you think you may need emergency care. For example, call if:    You passed out (lost consciousness).    Call your doctor now or seek immediate medical care if:    You are sick to your stomach or cannot drink fluids. You have symptoms of dehydration, such as:  Dry eyes and a dry mouth. Passing only a little urine. Feeling thirstier than usual.     You are not able to keep down your medicine. You have pain in your belly or pelvis. Watch closely for changes in your health, and be sure to contact your doctor if:    You do not get better as expected. Where can you learn more? Go to http://www.amanda.com/ and enter W450 to learn more about \"Managing Morning Sickness: Care Instructions. \"  Current as of: February 23, 2022               Content Version: 13.5  © 2006-2022 [employer]. Care instructions adapted under license by [facility] ([facility]). If you have questions about a medical condition or this instruction, always ask your healthcare professional. [address] any warranty or liability for your use of this information. Patient Education        Nutrition During Pregnancy: Care Instructions  Overview     Healthy eating when you are pregnant is important for you and your baby. It can help you feel well and have a successful pregnancy and delivery. During pregnancy your nutrition needs increase. Even if you have excellent eating habits, your doctor may recommend a multivitamin to make sure you get enough iron and folic acid. You may wonder how much weight you should gain. In general, if you were at a healthy weight before you became pregnant, then you should gain between 25 and 35 pounds. If you were overweight before pregnancy, then you'll likely be advised to gain 15 to 25 pounds. If you were underweight before pregnancy, then you'll probably be advised to gain 28 to 40 pounds. Your doctor will work with you to set a weight goal that is right for you. Gaining a healthy amount of weight helps you have a healthy baby. Follow-up care is a key part of your treatment and safety.  Be sure to make and go to all appointments, and call your doctor if you are having problems. It's also a good idea to know your test results and keep a list of the medicines you take.  How can you care for yourself at home?  Eat plenty of fruits and vegetables. Include a variety of orange, yellow, and leafy dark-green vegetables every day.  Choose whole-grain bread, cereal, and pasta. Good choices include whole wheat bread, whole wheat pasta, brown rice, and oatmeal.  Get 4 or more servings of milk and milk products each day. Good choices include nonfat or low-fat milk, yogurt, and cheese. If you cannot eat milk products, you can get calcium from calcium-fortified products such as orange juice, soy milk, and tofu. Other non-milk sources of calcium include leafy green vegetables, such as broccoli, kale, mustard greens, turnip greens, bok dara, and brussels sprouts.  If you eat meat, pick lower-fat types. Good choices include lean cuts of meat and chicken or turkey without the skin.  Do not eat shark, swordfish, aries mackerel, or tilefish. They have high levels of mercury, which is dangerous to your baby. You can eat up to 12 ounces a week of fish or shellfish that have low mercury levels. Good choices include shrimp, wild salmon, pollock, and catfish. Limit some other types of fish, such as white (albacore) tuna, to 4 oz (0.1 kg) a week.  Heat lunch meats (such as turkey, ham, or bologna) to 165°F before you eat them. This reduces your risk of getting sick from a kind of bacteria that can be found in lunch meats.  Do not eat unpasteurized soft cheeses, such as brie, feta, fresh mozzarella, and blue cheese. They have a bacteria that could harm your baby.  Limit caffeine. If you drink coffee or tea, have no more than 1 cup a day. Caffeine is also found in titus.  Do not drink any alcohol. No amount of alcohol has been found to be safe during pregnancy.  Do not diet or try to lose weight. For example, do not follow a low-carbohydrate diet. If  you are overweight at the start of your pregnancy, your doctor will work with you to manage your weight gain. Tell your doctor about all vitamins and supplements you take. When should you call for help? Watch closely for changes in your health, and be sure to contact your doctor if you have any problems. Where can you learn more? Go to http://www.woods.com/ and enter Y785 to learn more about \"Nutrition During Pregnancy: Care Instructions. \"  Current as of: May 9, 2022               Content Version: 13.5  © 4735-2115 [employer]. Care instructions adapted under license by [facility] ([facility]). If you have questions about a medical condition or this instruction, always ask your healthcare professional. [address] any warranty or liability for your use of this information. Patient Education        Weeks 6 to 10 of Your Pregnancy: Care Instructions  Your baby is growing very quickly. You may start to feel different, both in your body and your emotions. Each pregnancy is different, so there's no \"right\" way to feel. These early weeks are a time to make healthy choices for you and your baby. Take a daily prenatal vitamin. Choose one with folic acid in it. Avoid alcohol, tobacco, and drugs (including marijuana). They can harm your baby. Drink plenty of liquids. Be sure to drink enough water. And limit sodas, other sweetened drinks, and caffeine. Choose foods that are good sources of calcium, iron, and folate. You can try dark leafy greens, fortified orange juice and cereals, almonds, broccoli, dried fruit, and beans. Avoid foods that may harm your baby. Don't eat raw meat, deli meat, raw seafood, or raw eggs. Avoid soft cheese and unpasteurized dairy, like Brie and blue cheese. And don't eat fish that contains a lot of mercury, like shark and swordfish. Don't touch rajendra litter or cat poop. They can cause an infection that could harm your baby. Avoid things that can make your body too hot. For example, avoid hot tubs and saunas. Soothe morning sickness. Try eating 5 or 6 small meals a day, getting some fresh air, or using manuel to control symptoms. Follow-up care is a key part of your treatment and safety. Be sure to make and go to all appointments, and call your doctor if you are having problems. It's also a good idea to know your test results and keep a list of the medicines you take. Where can you learn more? Go to http://www.woods.com/ and enter G112 to learn more about \"Weeks 6 to 10 of Your Pregnancy: Care Instructions. \"  Current as of: 2022               Content Version: 13.5  © 3807-8882 [employer]. Care instructions adapted under license by [facility]. If you have questions about a medical condition or this instruction, always ask your healthcare professional. [place] [address] any warranty or liability for your use of this information. Learn and GO with Manuel              Scan the QR Code  below to access   parent education powered  by [employer]. [employer] gives you access to:     Prenatal   Labor and birth   Postpartum care   Breastfeeding   [place] care  Scan the QR code to access  [employer] at [facility] [facility] has many class options to meet the needs of growing families! Prenatal and  class offerings     Special Delivery - This prenatal childbirth class covers preparation for birth, labor, vaginal and  delivery, recovery, and postpartum.        9 am - 3 pm   &  5:30 pm - 8:30 pm    9 am - 3 pm   &   5:30 pm - 8:30 pm  May 6   9 am - 3 pm   &   5:30 pm - 8:30 pm     9 am - 3 pm   &  5:30 pm - 8:30 pm    9 am - 3 pm   & 15 5:30 pm - 8:30 pm    Welcome, Little One! - This  care class covers  characteristics, testing, and basic care of your . Information on your [employer] and other support services is also offered during class.   5:30pm - 7:30 pm    5:30pm - 7:30 pm    5:30pm - 7:30 pm    5:30pm - 7:30 pm    5:30pm - 7:30 pm    5:30pm - 7:30 pm    There is no charge for classes. To register for classes call  Obstetrics Department at                 [address] [place] [address]? Breastfeeding Basics - This class covers preparation for breastfeeding, the basics of milk production, breastfeeding in the first few weeks, and breast pump basics.   5:30 pm - 7:30 pm    5:30 pm - 7:30 pm  May 9   5:30 pm - 7:30 pm     5:30 pm - 7:30 pm    5:30 pm - 7:30 pm    5:30 pm - 7:30 pm    Your Sensational Baby - Babies experience life with all their senses. Infant massage helps infants grow and thrive. This class teaches adult caregivers of infants the basics of massaging your baby. This 45-60-minute class is offered in a five-week series (plan to attend all five classes with your baby). , 15, ,   11 am - noon  ,    11 am - noon  October 3, 10, , ,  11 am - noon    Isn't It [place]? - Grandparents or other special adults are welcome to attend this session to discuss changes in childbirth, parenting philosophies, and how to encourage and support the parents and babies that we love.   6 pm - 7 pm  May 22              6 pm - 7 pm    6 pm - 7 pm    6 pm - 7 pm    Join us on the third Thursday of each month at 10:30 am for Spilled Milk - feeding support group! Infant scale will be available for weight check or weighted feedings! Group is led by a breastfeeding expert, but [id number] support is encouraged. [employer] has many class options to meet the needs of growing families! Spilled Milk   No registration required. When:  Third Thursday of each month at 10:30 am  Where: [facility] meeting room at [facility] (near [facility] [address], [place], [place]  Who:  Breastfeeding parents, exclusive pumping parents, formula feeding parents. Come, bring your baby, network with other parents, and tap into some expert assistance! We will have a monthly starter topic, but discussion will be flexible based on participant needs! Infant scales will be available for infant weights or weighted feedings! Led by: Rachelle Ybarra MSN RN IBCLC  [phone]  l         Assessment:      Diagnosis Orders   1. Encounter for supervision of other normal pregnancy in first trimester  C.trachomatis N.gonorrhoeae DNA, Urine    Culture, Urine    Hepatitis C Antibody    HIV Screen    POCT urine pregnancy    Prenatal Profile I    Prenatal type and screen    Urine Drug Screen, Comprehensive      2. Amenorrhea  C.trachomatis N.gonorrhoeae DNA, Urine    Culture, Urine    Hepatitis C Antibody    HIV Screen    POCT urine pregnancy    Prenatal Profile I    Prenatal type and screen    Urine Drug Screen, Comprehensive      3.  Positive urine pregnancy test  C.trachomatis N.gonorrhoeae DNA, Urine    Culture, Urine    Hepatitis C Antibody    HIV Screen    POCT urine pregnancy    Prenatal Profile I    Prenatal type and screen    Urine Drug Screen, Comprehensive      4. 8 weeks gestation of pregnancy            Plan: Order Routine Prenatal Lab Yes     Order additional testing if requested         Order Prenatal Vitamins   No: OTC             Appointment with Gideon Hinojosa CNM in 4 weeks for Dating U/S and total body exam if indicated      Nurse:   Oscar Marc

## 2023-03-02 LAB
CHLAMYDIA DNA UR QL NAA+PROBE: NEGATIVE
MICROORGANISM SPEC CULT: NO GROWTH
N GONORRHOEA DNA UR QL NAA+PROBE: NEGATIVE
SPECIMEN DESCRIPTION: NORMAL
SPECIMEN DESCRIPTION: NORMAL

## 2023-03-27 ENCOUNTER — ROUTINE PRENATAL (OUTPATIENT)
Dept: OBGYN | Age: 23
End: 2023-03-27

## 2023-03-27 VITALS — BODY MASS INDEX: 18.66 KG/M2 | DIASTOLIC BLOOD PRESSURE: 74 MMHG | SYSTOLIC BLOOD PRESSURE: 106 MMHG | WEIGHT: 102 LBS

## 2023-03-27 DIAGNOSIS — O21.9 NAUSEA AND VOMITING IN PREGNANCY: ICD-10-CM

## 2023-03-27 DIAGNOSIS — Z34.81 ENCOUNTER FOR SUPERVISION OF OTHER NORMAL PREGNANCY IN FIRST TRIMESTER: Primary | ICD-10-CM

## 2023-03-27 DIAGNOSIS — Z3A.12 12 WEEKS GESTATION OF PREGNANCY: ICD-10-CM

## 2023-03-27 PROCEDURE — 0501F PRENATAL FLOW SHEET: CPT | Performed by: ADVANCED PRACTICE MIDWIFE

## 2023-03-27 RX ORDER — PROMETHAZINE HYDROCHLORIDE 25 MG/1
25 TABLET ORAL EVERY 6 HOURS PRN
Qty: 30 TABLET | Refills: 1 | Status: SHIPPED | OUTPATIENT
Start: 2023-03-27

## 2023-03-27 RX ORDER — ONDANSETRON 4 MG/1
4 TABLET, ORALLY DISINTEGRATING ORAL EVERY 8 HOURS PRN
Qty: 30 TABLET | Refills: 1 | Status: SHIPPED | OUTPATIENT
Start: 2023-03-27

## 2023-03-27 NOTE — PROGRESS NOTES
medroxyPROGESTERone and Prenatal MV-Min-Fe Fum-FA-DHA (PRENATAL 1 PO). Return in about 2 weeks (around 4/10/2023) for weight and urine check. There are no Patient Instructions on file for this visit.              DOROTHY Haile - JEFFREY,3/27/2023 3:02 PM

## 2023-04-25 SDOH — ECONOMIC STABILITY: INCOME INSECURITY: HOW HARD IS IT FOR YOU TO PAY FOR THE VERY BASICS LIKE FOOD, HOUSING, MEDICAL CARE, AND HEATING?: NOT HARD AT ALL

## 2023-04-25 SDOH — ECONOMIC STABILITY: FOOD INSECURITY: WITHIN THE PAST 12 MONTHS, THE FOOD YOU BOUGHT JUST DIDN'T LAST AND YOU DIDN'T HAVE MONEY TO GET MORE.: NEVER TRUE

## 2023-04-25 SDOH — ECONOMIC STABILITY: FOOD INSECURITY: WITHIN THE PAST 12 MONTHS, YOU WORRIED THAT YOUR FOOD WOULD RUN OUT BEFORE YOU GOT MONEY TO BUY MORE.: NEVER TRUE

## 2023-04-25 SDOH — ECONOMIC STABILITY: TRANSPORTATION INSECURITY
IN THE PAST 12 MONTHS, HAS LACK OF TRANSPORTATION KEPT YOU FROM MEETINGS, WORK, OR FROM GETTING THINGS NEEDED FOR DAILY LIVING?: NO

## 2023-04-25 SDOH — ECONOMIC STABILITY: HOUSING INSECURITY
IN THE LAST 12 MONTHS, WAS THERE A TIME WHEN YOU DID NOT HAVE A STEADY PLACE TO SLEEP OR SLEPT IN A SHELTER (INCLUDING NOW)?: NO

## 2023-04-26 ENCOUNTER — ROUTINE PRENATAL (OUTPATIENT)
Dept: OBGYN | Age: 23
End: 2023-04-26

## 2023-04-26 VITALS — WEIGHT: 106.4 LBS | SYSTOLIC BLOOD PRESSURE: 118 MMHG | DIASTOLIC BLOOD PRESSURE: 70 MMHG | BODY MASS INDEX: 19.46 KG/M2

## 2023-04-26 DIAGNOSIS — Z34.82 ENCOUNTER FOR SUPERVISION OF OTHER NORMAL PREGNANCY IN SECOND TRIMESTER: Primary | ICD-10-CM

## 2023-04-26 DIAGNOSIS — O21.9 NAUSEA AND VOMITING IN PREGNANCY: ICD-10-CM

## 2023-04-26 DIAGNOSIS — Z3A.16 16 WEEKS GESTATION OF PREGNANCY: ICD-10-CM

## 2023-04-26 PROCEDURE — 0502F SUBSEQUENT PRENATAL CARE: CPT | Performed by: ADVANCED PRACTICE MIDWIFE

## 2023-04-26 RX ORDER — ONDANSETRON 4 MG/1
4 TABLET, ORALLY DISINTEGRATING ORAL EVERY 8 HOURS PRN
Qty: 30 TABLET | Refills: 1 | Status: SHIPPED | OUTPATIENT
Start: 2023-04-26

## 2023-04-26 RX ORDER — PROMETHAZINE HYDROCHLORIDE 25 MG/1
25 TABLET ORAL EVERY 6 HOURS PRN
Qty: 30 TABLET | Refills: 1 | Status: SHIPPED | OUTPATIENT
Start: 2023-04-26

## 2023-04-26 NOTE — PROGRESS NOTES
Sudarshan Morfin is here at 16w5d for:    Chief Complaint   Patient presents with    Routine Prenatal Visit     Refill Zofran and Phenergan. Estimated Due Date: Estimated Date of Delivery: 10/6/23    OB History    Para Term  AB Living   2 1 1     1   SAB IAB Ectopic Molar Multiple Live Births           0 1      # Outcome Date GA Lbr Hugo/2nd Weight Sex Delivery Anes PTL Lv   2 Current            1 Term 21 39w0d 02:14 / 00:41 6 lb 8.7 oz (2.968 kg) F Vag-Spont EPI N [place]        No past medical history on file. No past surgical history on file. Social History     Tobacco Use   Smoking Status Never   Smokeless Tobacco Never        Social History     Substance and Sexual Activity   Alcohol Use Never       No results found for this visit on 23. HPI: here for routine ob visit, continues with NVP    PT denies fever, chills, nausea and vomiting       Vitals:  Estimated body mass index is 19.46 kg/m² as calculated from the following:    Height as of 22: 5' 2\" (1.575 m). Weight as of this encounter: 106 lb 6.4 oz (48.3 kg). BP: 118/70  Weight: 106 lb 6.4 oz (48.3 kg)  Patient Position: Sitting  Albumin: Negative  Glucose: Negative  Movement: Present           Abdomen: flat, soft, nontender    Results reviewed today:    No results found for this visit on 23. ASSESSMENT & Plan    Diagnosis Orders   1. Encounter for supervision of other normal pregnancy in second trimester        2. Nausea and vomiting in pregnancy  ondansetron (ZOFRAN-ODT) 4 MG disintegrating tablet    promethazine (PHENERGAN) 25 MG tablet      3. 16 weeks gestation of pregnancy                  I am having Elisha Rivers Plate maintain her medroxyPROGESTERone, Prenatal MV-Min-Fe Fum-FA-DHA (PRENATAL 1 PO), ondansetron, and promethazine. Return in about 4 weeks (around 2023) for ob 20wkus. There are no Patient Instructions on file for this visit.              Deshawn Pitt, APRN -

## 2023-05-24 ENCOUNTER — ROUTINE PRENATAL (OUTPATIENT)
Dept: OBGYN | Age: 23
End: 2023-05-24

## 2023-05-24 VITALS — DIASTOLIC BLOOD PRESSURE: 62 MMHG | WEIGHT: 112 LBS | SYSTOLIC BLOOD PRESSURE: 110 MMHG | BODY MASS INDEX: 20.49 KG/M2

## 2023-05-24 DIAGNOSIS — Z3A.20 20 WEEKS GESTATION OF PREGNANCY: ICD-10-CM

## 2023-05-24 DIAGNOSIS — Z34.82 ENCOUNTER FOR SUPERVISION OF OTHER NORMAL PREGNANCY IN SECOND TRIMESTER: Primary | ICD-10-CM

## 2023-06-19 ENCOUNTER — ROUTINE PRENATAL (OUTPATIENT)
Dept: OBGYN | Age: 23
End: 2023-06-19

## 2023-06-19 VITALS — SYSTOLIC BLOOD PRESSURE: 116 MMHG | BODY MASS INDEX: 20.67 KG/M2 | DIASTOLIC BLOOD PRESSURE: 62 MMHG | WEIGHT: 113 LBS

## 2023-06-19 DIAGNOSIS — Z3A.24 24 WEEKS GESTATION OF PREGNANCY: ICD-10-CM

## 2023-06-19 DIAGNOSIS — Z34.92 NORMAL PREGNANCY IN SECOND TRIMESTER: Primary | ICD-10-CM

## 2023-06-19 PROCEDURE — 0502F SUBSEQUENT PRENATAL CARE: CPT

## 2023-06-19 PROCEDURE — G8427 DOCREV CUR MEDS BY ELIG CLIN: HCPCS

## 2023-06-19 PROCEDURE — G8420 CALC BMI NORM PARAMETERS: HCPCS

## 2023-06-19 PROCEDURE — 1036F TOBACCO NON-USER: CPT

## 2023-07-18 ENCOUNTER — ROUTINE PRENATAL (OUTPATIENT)
Dept: OBGYN | Age: 23
End: 2023-07-18
Payer: COMMERCIAL

## 2023-07-18 VITALS — SYSTOLIC BLOOD PRESSURE: 122 MMHG | BODY MASS INDEX: 21.58 KG/M2 | WEIGHT: 118 LBS | DIASTOLIC BLOOD PRESSURE: 70 MMHG

## 2023-07-18 DIAGNOSIS — Z34.83 ENCOUNTER FOR SUPERVISION OF OTHER NORMAL PREGNANCY IN THIRD TRIMESTER: ICD-10-CM

## 2023-07-18 DIAGNOSIS — Z3A.28 28 WEEKS GESTATION OF PREGNANCY: Primary | ICD-10-CM

## 2023-07-18 LAB
GLUCOSE 60 MIN, POC: 126
HGB, POC: 11.5

## 2023-07-18 PROCEDURE — 0502F SUBSEQUENT PRENATAL CARE: CPT | Performed by: ADVANCED PRACTICE MIDWIFE

## 2023-07-18 PROCEDURE — 82950 GLUCOSE TEST: CPT | Performed by: ADVANCED PRACTICE MIDWIFE

## 2023-07-18 PROCEDURE — 85018 HEMOGLOBIN: CPT | Performed by: ADVANCED PRACTICE MIDWIFE

## 2023-08-02 ENCOUNTER — ROUTINE PRENATAL (OUTPATIENT)
Dept: OBGYN | Age: 23
End: 2023-08-02

## 2023-08-02 VITALS — DIASTOLIC BLOOD PRESSURE: 74 MMHG | BODY MASS INDEX: 22.13 KG/M2 | WEIGHT: 121 LBS | SYSTOLIC BLOOD PRESSURE: 116 MMHG

## 2023-08-02 DIAGNOSIS — Z3A.30 30 WEEKS GESTATION OF PREGNANCY: ICD-10-CM

## 2023-08-02 DIAGNOSIS — Z34.83 ENCOUNTER FOR SUPERVISION OF OTHER NORMAL PREGNANCY IN THIRD TRIMESTER: Primary | ICD-10-CM

## 2023-08-02 PROCEDURE — 0502F SUBSEQUENT PRENATAL CARE: CPT | Performed by: ADVANCED PRACTICE MIDWIFE

## 2023-08-15 ENCOUNTER — ROUTINE PRENATAL (OUTPATIENT)
Dept: OBGYN | Age: 23
End: 2023-08-15

## 2023-08-15 VITALS — SYSTOLIC BLOOD PRESSURE: 122 MMHG | BODY MASS INDEX: 22.68 KG/M2 | WEIGHT: 124 LBS | DIASTOLIC BLOOD PRESSURE: 70 MMHG

## 2023-08-15 DIAGNOSIS — Z34.83 ENCOUNTER FOR SUPERVISION OF OTHER NORMAL PREGNANCY IN THIRD TRIMESTER: Primary | ICD-10-CM

## 2023-08-15 DIAGNOSIS — Z3A.32 32 WEEKS GESTATION OF PREGNANCY: ICD-10-CM

## 2023-08-15 PROCEDURE — 0502F SUBSEQUENT PRENATAL CARE: CPT | Performed by: ADVANCED PRACTICE MIDWIFE

## 2023-09-02 ENCOUNTER — HOSPITAL ENCOUNTER (OUTPATIENT)
Age: 23
Discharge: HOME | End: 2023-09-02
Attending: OBSTETRICS & GYNECOLOGY | Admitting: OBSTETRICS & GYNECOLOGY
Payer: COMMERCIAL

## 2023-09-02 VITALS
SYSTOLIC BLOOD PRESSURE: 108 MMHG | RESPIRATION RATE: 18 BRPM | HEART RATE: 84 BPM | DIASTOLIC BLOOD PRESSURE: 56 MMHG | TEMPERATURE: 98.3 F

## 2023-09-02 PROBLEM — Z3A.35 35 WEEKS GESTATION OF PREGNANCY: Status: ACTIVE | Noted: 2023-09-02

## 2023-09-02 LAB
ALBUMIN SERPL-MCNC: 3.6 G/DL (ref 3.5–5.2)
ALBUMIN/GLOB SERPL: 1.4 {RATIO} (ref 1–2.5)
ALP SERPL-CCNC: 135 U/L (ref 35–104)
ALT SERPL-CCNC: 7 U/L (ref 5–33)
ANION GAP SERPL CALCULATED.3IONS-SCNC: 10 MMOL/L (ref 9–17)
AST SERPL-CCNC: 20 U/L
BACTERIA URNS QL MICRO: ABNORMAL
BILIRUB SERPL-MCNC: 0.7 MG/DL (ref 0.3–1.2)
BILIRUB UR QL STRIP: NEGATIVE
BUN SERPL-MCNC: 7 MG/DL (ref 6–20)
BUN/CREAT SERPL: 23 (ref 9–20)
CALCIUM SERPL-MCNC: 8.8 MG/DL (ref 8.6–10.4)
CHLORIDE SERPL-SCNC: 104 MMOL/L (ref 98–107)
CLARITY UR: ABNORMAL
CO2 SERPL-SCNC: 22 MMOL/L (ref 20–31)
COLOR UR: YELLOW
CREAT SERPL-MCNC: 0.3 MG/DL (ref 0.5–0.9)
EPI CELLS #/AREA URNS HPF: ABNORMAL /HPF (ref 0–25)
ERYTHROCYTE [DISTWIDTH] IN BLOOD BY AUTOMATED COUNT: 12.9 % (ref 11.8–14.4)
GFR SERPL CREATININE-BSD FRML MDRD: >60 ML/MIN/1.73M2
GLUCOSE SERPL-MCNC: 99 MG/DL (ref 70–99)
GLUCOSE UR STRIP-MCNC: NEGATIVE MG/DL
HCT VFR BLD AUTO: 31.2 % (ref 36.3–47.1)
HGB BLD-MCNC: 10.3 G/DL (ref 11.9–15.1)
HGB UR QL STRIP.AUTO: NEGATIVE
KETONES UR STRIP-MCNC: NEGATIVE MG/DL
LEUKOCYTE ESTERASE UR QL STRIP: ABNORMAL
MCH RBC QN AUTO: 28.5 PG (ref 25.2–33.5)
MCHC RBC AUTO-ENTMCNC: 33 G/DL (ref 28.4–34.8)
MCV RBC AUTO: 86.4 FL (ref 82.6–102.9)
MUCOUS THREADS URNS QL MICRO: ABNORMAL
NITRITE UR QL STRIP: NEGATIVE
NRBC BLD-RTO: 0 PER 100 WBC
PH UR STRIP: 8.5 [PH] (ref 5–9)
PLATELET # BLD AUTO: 212 K/UL (ref 138–453)
PMV BLD AUTO: 10.3 FL (ref 8.1–13.5)
POTASSIUM SERPL-SCNC: 3.8 MMOL/L (ref 3.7–5.3)
PROT SERPL-MCNC: 6.2 G/DL (ref 6.4–8.3)
PROT UR STRIP-MCNC: ABNORMAL MG/DL
RBC # BLD AUTO: 3.61 M/UL (ref 3.95–5.11)
RBC #/AREA URNS HPF: ABNORMAL /HPF (ref 0–2)
SODIUM SERPL-SCNC: 136 MMOL/L (ref 135–144)
SP GR UR STRIP: 1.01 (ref 1.01–1.02)
UROBILINOGEN UR STRIP-ACNC: ABNORMAL EU/DL (ref 0–1)
WBC #/AREA URNS HPF: ABNORMAL /HPF (ref 0–5)
WBC OTHER # BLD: 13.1 K/UL (ref 4.5–13.5)

## 2023-09-02 PROCEDURE — 99211 OFF/OP EST MAY X REQ PHY/QHP: CPT

## 2023-09-02 PROCEDURE — 96360 HYDRATION IV INFUSION INIT: CPT

## 2023-09-02 PROCEDURE — 87086 URINE CULTURE/COLONY COUNT: CPT

## 2023-09-02 PROCEDURE — 2580000003 HC RX 258: Performed by: OBSTETRICS & GYNECOLOGY

## 2023-09-02 PROCEDURE — 96365 THER/PROPH/DIAG IV INF INIT: CPT

## 2023-09-02 PROCEDURE — 81001 URINALYSIS AUTO W/SCOPE: CPT

## 2023-09-02 PROCEDURE — 59025 FETAL NON-STRESS TEST: CPT

## 2023-09-02 PROCEDURE — 6360000002 HC RX W HCPCS: Performed by: OBSTETRICS & GYNECOLOGY

## 2023-09-02 PROCEDURE — 80053 COMPREHEN METABOLIC PANEL: CPT

## 2023-09-02 PROCEDURE — 36415 COLL VENOUS BLD VENIPUNCTURE: CPT

## 2023-09-02 PROCEDURE — 85027 COMPLETE CBC AUTOMATED: CPT

## 2023-09-02 RX ORDER — SODIUM CHLORIDE, SODIUM LACTATE, POTASSIUM CHLORIDE, AND CALCIUM CHLORIDE .6; .31; .03; .02 G/100ML; G/100ML; G/100ML; G/100ML
1000 INJECTION, SOLUTION INTRAVENOUS ONCE
Status: COMPLETED | OUTPATIENT
Start: 2023-09-02 | End: 2023-09-02

## 2023-09-02 RX ORDER — SODIUM CHLORIDE, SODIUM LACTATE, POTASSIUM CHLORIDE, AND CALCIUM CHLORIDE .6; .31; .03; .02 G/100ML; G/100ML; G/100ML; G/100ML
500 INJECTION, SOLUTION INTRAVENOUS ONCE
Status: DISCONTINUED | OUTPATIENT
Start: 2023-09-02 | End: 2023-09-03 | Stop reason: HOSPADM

## 2023-09-02 RX ORDER — CEFAZOLIN SODIUM IN 0.9 % NACL 2 G/100 ML
2000 PLASTIC BAG, INJECTION (ML) INTRAVENOUS ONCE
Status: COMPLETED | OUTPATIENT
Start: 2023-09-02 | End: 2023-09-02

## 2023-09-02 RX ADMIN — SODIUM CHLORIDE, SODIUM LACTATE, POTASSIUM CHLORIDE, AND CALCIUM CHLORIDE 500 ML: .6; .31; .03; .02 INJECTION, SOLUTION INTRAVENOUS at 19:00

## 2023-09-02 RX ADMIN — Medication 2000 MG: at 19:14

## 2023-09-02 RX ADMIN — SODIUM CHLORIDE, POTASSIUM CHLORIDE, SODIUM LACTATE AND CALCIUM CHLORIDE 1000 ML: 600; 310; 30; 20 INJECTION, SOLUTION INTRAVENOUS at 17:44

## 2023-09-02 NOTE — FLOWSHEET NOTE
Patient presents to L&D today for nausea vomiting diarhhea, abdominal pain     IUP at 35 weeks     NST is reactive. Quality of tracing is satisfactory.

## 2023-09-02 NOTE — FLOWSHEET NOTE
Labs reviewed with dr. Poornima Carvajal. Additional order for 500 ml bolus secondary to first taken.    Labs reviewed  Will give patient button for NST

## 2023-09-02 NOTE — FLOWSHEET NOTE
Patient admitted to room 200 from home per wheelchair. Here with c/o nausea vomiting diarrhea stomach pain. Denies ROM or vaginal bleeding. States shes had some luigi garcia Denies fever/chills. States she doesn't believe she is sick. Relates of + fetal movement. Request for urine sample made. Instructed on clean catch urine. Consent obtained. Patient verbalizes understanding and acceptance. Assisted into bed, Siderails up x2. Call light in reach. Bed in low position. Oriented to room, surroundings, call system and plan of care. Patient verbalizes understanding. EFM applied and monitor test completed/passed.      Orders from Dr. Williamson Rather taken and entered after reviewing case and EFM tracing

## 2023-09-03 NOTE — DISCHARGE INSTRUCTIONS
[id number]     Dr. X[facility]   [address]   [phone]    [address] [place] [address]. [address] [facility], [address] [place]  [phone]    [address],  [place]  [phone]      ACTIVITY LIMITATIONS:  ( X )Up and about as desired and tolerated  (  )Up to bathroom only  (  )Lizzy Pedlar on either side  (  )Avoid heavy lifting or exercise  (  )No sex  (  )No nipple stimulation  (  )Complet bedrest  (  )Avoid using stairs  (X  )Increase fluids  **DRINK AT LEAST eight-8oz. Glasses of water daily. **    Call your Doctor if:  (X  )Contractions are every 5 minutes apart (from start of one to the start of the next contraction) lasting 60 seconds for at least 1 hour, strong enough you can not walk or talk through the contraction and regular. ( X )Bag of water breaks  ( X )Vaginal bleeding  (X  )Unusual pain occurs  ( X )Decreased fetal movement  (  ) labor:  If you have 4 contractions in an hour    Keep your scheduled follow up appointment. Or call for a follow up on________________________________________________. IN CASE OF EMERGENCY CONTACT LABOR AND DELIVERY [phone].

## 2023-09-03 NOTE — FLOWSHEET NOTE
Discharge instructions discussed with patient and copy given with patient verbalizing understanding. Patient voices no concerns and ambulates out of unit accompanied by her significant other.

## 2023-09-04 ENCOUNTER — HOSPITAL ENCOUNTER (OUTPATIENT)
Age: 23
Discharge: HOME | End: 2023-09-04
Attending: OBSTETRICS & GYNECOLOGY | Admitting: OBSTETRICS & GYNECOLOGY
Payer: COMMERCIAL

## 2023-09-04 VITALS — SYSTOLIC BLOOD PRESSURE: 104 MMHG | HEART RATE: 73 BPM | DIASTOLIC BLOOD PRESSURE: 59 MMHG

## 2023-09-04 DIAGNOSIS — O21.9 NAUSEA AND VOMITING IN PREGNANCY: ICD-10-CM

## 2023-09-04 PROBLEM — O47.9 UTERINE CONTRACTIONS: Status: ACTIVE | Noted: 2023-09-04

## 2023-09-04 LAB
BACTERIA URNS QL MICRO: ABNORMAL
BILIRUB UR QL STRIP: NEGATIVE
CLARITY UR: ABNORMAL
COLOR UR: YELLOW
EPI CELLS #/AREA URNS HPF: ABNORMAL /HPF (ref 0–25)
GLUCOSE UR STRIP-MCNC: NEGATIVE MG/DL
HGB UR QL STRIP.AUTO: NEGATIVE
KETONES UR STRIP-MCNC: ABNORMAL MG/DL
LEUKOCYTE ESTERASE UR QL STRIP: ABNORMAL
MICROORGANISM SPEC CULT: NORMAL
MUCOUS THREADS URNS QL MICRO: ABNORMAL
NITRITE UR QL STRIP: NEGATIVE
PH UR STRIP: 6 [PH] (ref 5–9)
PROT UR STRIP-MCNC: ABNORMAL MG/DL
RBC #/AREA URNS HPF: ABNORMAL /HPF (ref 0–2)
SP GR UR STRIP: 1.02 (ref 1.01–1.02)
SPECIMEN DESCRIPTION: NORMAL
UROBILINOGEN UR STRIP-ACNC: NORMAL EU/DL (ref 0–1)
WBC #/AREA URNS HPF: ABNORMAL /HPF (ref 0–5)

## 2023-09-04 PROCEDURE — 2580000003 HC RX 258: Performed by: OBSTETRICS & GYNECOLOGY

## 2023-09-04 PROCEDURE — 81001 URINALYSIS AUTO W/SCOPE: CPT

## 2023-09-04 PROCEDURE — 6360000002 HC RX W HCPCS: Performed by: OBSTETRICS & GYNECOLOGY

## 2023-09-04 RX ORDER — CEFAZOLIN SODIUM IN 0.9 % NACL 2 G/100 ML
2000 PLASTIC BAG, INJECTION (ML) INTRAVENOUS ONCE
Status: COMPLETED | OUTPATIENT
Start: 2023-09-04 | End: 2023-09-04

## 2023-09-04 RX ORDER — SODIUM CHLORIDE, SODIUM LACTATE, POTASSIUM CHLORIDE, AND CALCIUM CHLORIDE .6; .31; .03; .02 G/100ML; G/100ML; G/100ML; G/100ML
1000 INJECTION, SOLUTION INTRAVENOUS ONCE
Status: COMPLETED | OUTPATIENT
Start: 2023-09-04 | End: 2023-09-04

## 2023-09-04 RX ORDER — ONDANSETRON 4 MG/1
4 TABLET, ORALLY DISINTEGRATING ORAL EVERY 6 HOURS PRN
Status: DISCONTINUED | OUTPATIENT
Start: 2023-09-04 | End: 2023-09-04 | Stop reason: CLARIF

## 2023-09-04 RX ORDER — ONDANSETRON 4 MG/1
4 TABLET, ORALLY DISINTEGRATING ORAL EVERY 6 HOURS PRN
Qty: 20 TABLET | Refills: 0 | Status: SHIPPED | OUTPATIENT
Start: 2023-09-04

## 2023-09-04 RX ADMIN — SODIUM CHLORIDE, POTASSIUM CHLORIDE, SODIUM LACTATE AND CALCIUM CHLORIDE 1000 ML: 600; 310; 30; 20 INJECTION, SOLUTION INTRAVENOUS at 14:21

## 2023-09-04 RX ADMIN — Medication 2000 MG: at 14:47

## 2023-09-04 NOTE — PROGRESS NOTES
Patient presents to L&D today for patient reassurance and rule out uterine contractions    IUP at 35.3 weeks     NST is reactive. Quality of tracing is satisfactory.

## 2023-09-04 NOTE — PROGRESS NOTES
19yo F arrives with family with c/o stomach pain, pt reports she is unsure if she is having ctxs. Reports she was here 2 days ago for N/V/D. Pt has concern that it could potentially be her gallbladder. Pt denies vaginal bleeding or any unusual discharge. Explained to change into a gown and provide urine specimen. Pt instructed on NST. Call light within reach, pt denies needs at this time.

## 2023-09-04 NOTE — DISCHARGE INSTRUCTIONS
Catracho Hercules MD  [facility]  Dr. Margarito Leija MD  [facility] [address] [facility] [phone]   or Moy Lau [phone]     Dr Margarito Munguia [id number]  ([address] [facility], MSN, APRN, [address] [place] [address]  [address]  [phone]     Dr. Bozena Gutiérrez   [address] [facility]  [address] [facility]   [phone]    [address] [facility]. [address] [place] [address], [address] [facility]  [phone]    [address],  [employer]  [phone]      ACTIVITY LIMITATIONS:  (  )Up and about as desired and tolerated  (  )Up to bathroom only  (  )Juan Lindau on either side  (  )Avoid heavy lifting or exercise  (  )No sex  (  )No nipple stimulation  (  )Complet bedrest  (  )Avoid using stairs  (  )Increase fluids  **DRINK AT LEAST eight-8oz. Glasses of water daily. **    Call your Doctor if:  (  )Contractions are every 5 minutes apart (from start of one to the start of the next contraction) lasting 60 seconds for at least 1 hour, strong enough you can not walk or talk through the contraction and regular. (  )Bag of water breaks  (  )Vaginal bleeding  (  )Unusual pain occurs  (  )Decreased fetal movement  (  ) labor:  If you have 4 contractions in an hour    Keep your scheduled follow up appointment. Or call for a follow up on________________________________________________. IN CASE OF EMERGENCY CONTACT LABOR AND DELIVERY [phone].

## 2023-09-04 NOTE — PROGRESS NOTES
Call made to Dr. Cl Patterson, informed of SVE and ctx pattern. Also discussed urine results, verbal order for antibiotics and fluid bolus given at this time. Reported to recheck pt following bolus and call back with results.

## 2023-09-04 NOTE — PROGRESS NOTES
Call made to Dr. Alan Biggs informed ctz have spaced out and pt continues to rate them at a 2/3 out of 10. Pt reports more concern for potential gallbladder issues than pregnancy issue. Pt educated on labs and informed to discuss with regular OB potential US order once delivered. Dr. Alan Biggs reports pt may be discharged home d/t unchanged cervix and ctx spacing out. Pt agreeable with POC.

## 2023-09-11 ENCOUNTER — HOSPITAL ENCOUNTER (OUTPATIENT)
Age: 23
Setting detail: SPECIMEN
Discharge: HOME | End: 2023-09-11
Payer: COMMERCIAL

## 2023-09-11 ENCOUNTER — ROUTINE PRENATAL (OUTPATIENT)
Dept: OBGYN | Age: 23
End: 2023-09-11

## 2023-09-11 VITALS — DIASTOLIC BLOOD PRESSURE: 68 MMHG | BODY MASS INDEX: 23.78 KG/M2 | WEIGHT: 130 LBS | SYSTOLIC BLOOD PRESSURE: 112 MMHG

## 2023-09-11 DIAGNOSIS — Z34.83 ENCOUNTER FOR SUPERVISION OF OTHER NORMAL PREGNANCY IN THIRD TRIMESTER: Primary | ICD-10-CM

## 2023-09-11 DIAGNOSIS — Z3A.36 36 WEEKS GESTATION OF PREGNANCY: ICD-10-CM

## 2023-09-11 PROCEDURE — 87081 CULTURE SCREEN ONLY: CPT

## 2023-09-11 PROCEDURE — 0502F SUBSEQUENT PRENATAL CARE: CPT | Performed by: ADVANCED PRACTICE MIDWIFE

## 2023-09-15 LAB
MICROORGANISM SPEC CULT: NORMAL
SPECIMEN DESCRIPTION: NORMAL

## 2023-09-18 ENCOUNTER — ROUTINE PRENATAL (OUTPATIENT)
Dept: OBGYN | Age: 23
End: 2023-09-18

## 2023-09-18 VITALS — SYSTOLIC BLOOD PRESSURE: 108 MMHG | DIASTOLIC BLOOD PRESSURE: 68 MMHG | BODY MASS INDEX: 23.27 KG/M2 | WEIGHT: 127.2 LBS

## 2023-09-18 DIAGNOSIS — Z34.83 ENCOUNTER FOR SUPERVISION OF OTHER NORMAL PREGNANCY IN THIRD TRIMESTER: Primary | ICD-10-CM

## 2023-09-18 DIAGNOSIS — Z3A.37 37 WEEKS GESTATION OF PREGNANCY: ICD-10-CM

## 2023-09-18 PROCEDURE — 0502F SUBSEQUENT PRENATAL CARE: CPT | Performed by: ADVANCED PRACTICE MIDWIFE

## 2023-09-21 ENCOUNTER — HOSPITAL ENCOUNTER (INPATIENT)
Age: 23
LOS: 2 days | Discharge: HOME | End: 2023-09-23
Attending: ADVANCED PRACTICE MIDWIFE | Admitting: ADVANCED PRACTICE MIDWIFE
Payer: COMMERCIAL

## 2023-09-21 ENCOUNTER — ANESTHESIA EVENT (OUTPATIENT)
Dept: LABOR AND DELIVERY | Age: 23
End: 2023-09-21
Payer: COMMERCIAL

## 2023-09-21 ENCOUNTER — ANESTHESIA (OUTPATIENT)
Dept: LABOR AND DELIVERY | Age: 23
End: 2023-09-21
Payer: COMMERCIAL

## 2023-09-21 LAB
ABO + RH BLD: NORMAL
ARM BAND NUMBER: NORMAL
BASOPHILS # BLD: 0.07 K/UL (ref 0–0.2)
BASOPHILS NFR BLD: 1 % (ref 0–2)
BILIRUB UR QL STRIP: NEGATIVE
BLOOD BANK SAMPLE EXPIRATION: NORMAL
BLOOD GROUP ANTIBODIES SERPL: NEGATIVE
CLARITY UR: CLEAR
COLOR UR: YELLOW
EOSINOPHIL # BLD: 0.16 K/UL (ref 0–0.44)
EOSINOPHILS RELATIVE PERCENT: 2 % (ref 1–4)
EPI CELLS #/AREA URNS HPF: NORMAL /HPF (ref 0–25)
ERYTHROCYTE [DISTWIDTH] IN BLOOD BY AUTOMATED COUNT: 13.3 % (ref 11.8–14.4)
GLUCOSE UR STRIP-MCNC: NEGATIVE MG/DL
HCT VFR BLD AUTO: 32.7 % (ref 36.3–47.1)
HGB BLD-MCNC: 10.7 G/DL (ref 11.9–15.1)
HGB UR QL STRIP.AUTO: NEGATIVE
IMM GRANULOCYTES # BLD AUTO: 0.12 K/UL (ref 0–0.3)
IMM GRANULOCYTES NFR BLD: 1 %
KETONES UR STRIP-MCNC: NEGATIVE MG/DL
LEUKOCYTE ESTERASE UR QL STRIP: ABNORMAL
LYMPHOCYTES NFR BLD: 2.35 K/UL (ref 1.1–3.7)
LYMPHOCYTES RELATIVE PERCENT: 22 % (ref 25–45)
MCH RBC QN AUTO: 28.1 PG (ref 25.2–33.5)
MCHC RBC AUTO-ENTMCNC: 32.7 G/DL (ref 28.4–34.8)
MCV RBC AUTO: 85.8 FL (ref 82.6–102.9)
MONOCYTES NFR BLD: 0.82 K/UL (ref 0.1–1.4)
MONOCYTES NFR BLD: 8 % (ref 2–8)
NEUTROPHILS NFR BLD: 66 % (ref 34–64)
NEUTS SEG NFR BLD: 7.09 K/UL (ref 1.5–8.1)
NITRITE UR QL STRIP: NEGATIVE
NRBC BLD-RTO: 0 PER 100 WBC
PH UR STRIP: 7 [PH] (ref 5–9)
PLATELET # BLD AUTO: 252 K/UL (ref 138–453)
PMV BLD AUTO: 11.4 FL (ref 8.1–13.5)
PROT UR STRIP-MCNC: NEGATIVE MG/DL
RBC # BLD AUTO: 3.81 M/UL (ref 3.95–5.11)
RBC #/AREA URNS HPF: NORMAL /HPF (ref 0–2)
SP GR UR STRIP: 1.01 (ref 1.01–1.02)
UROBILINOGEN UR STRIP-ACNC: NORMAL EU/DL (ref 0–1)
WBC #/AREA URNS HPF: NORMAL /HPF (ref 0–5)
WBC OTHER # BLD: 10.6 K/UL (ref 4.5–13.5)

## 2023-09-21 PROCEDURE — 81001 URINALYSIS AUTO W/SCOPE: CPT

## 2023-09-21 PROCEDURE — 86901 BLOOD TYPING SEROLOGIC RH(D): CPT

## 2023-09-21 PROCEDURE — 86850 RBC ANTIBODY SCREEN: CPT

## 2023-09-21 PROCEDURE — 1220000000 HC SEMI PRIVATE OB R&B

## 2023-09-21 PROCEDURE — 6360000002 HC RX W HCPCS: Performed by: NURSE ANESTHETIST, CERTIFIED REGISTERED

## 2023-09-21 PROCEDURE — 85025 COMPLETE CBC W/AUTO DIFF WBC: CPT

## 2023-09-21 PROCEDURE — 3700000025 EPIDURAL BLOCK: Performed by: NURSE ANESTHETIST, CERTIFIED REGISTERED

## 2023-09-21 PROCEDURE — 6360000002 HC RX W HCPCS: Performed by: ADVANCED PRACTICE MIDWIFE

## 2023-09-21 PROCEDURE — 86900 BLOOD TYPING SEROLOGIC ABO: CPT

## 2023-09-21 PROCEDURE — 2580000003 HC RX 258: Performed by: ADVANCED PRACTICE MIDWIFE

## 2023-09-21 PROCEDURE — 36415 COLL VENOUS BLD VENIPUNCTURE: CPT

## 2023-09-21 RX ORDER — SODIUM CHLORIDE 0.9 % (FLUSH) 0.9 %
5-40 SYRINGE (ML) INJECTION PRN
Status: DISCONTINUED | OUTPATIENT
Start: 2023-09-21 | End: 2023-09-22

## 2023-09-21 RX ORDER — SODIUM CHLORIDE, SODIUM LACTATE, POTASSIUM CHLORIDE, CALCIUM CHLORIDE 600; 310; 30; 20 MG/100ML; MG/100ML; MG/100ML; MG/100ML
INJECTION, SOLUTION INTRAVENOUS CONTINUOUS
Status: DISCONTINUED | OUTPATIENT
Start: 2023-09-21 | End: 2023-09-22

## 2023-09-21 RX ORDER — NALBUPHINE HYDROCHLORIDE 10 MG/ML
10 INJECTION, SOLUTION INTRAMUSCULAR; INTRAVENOUS; SUBCUTANEOUS
Status: DISCONTINUED | OUTPATIENT
Start: 2023-09-21 | End: 2023-09-22

## 2023-09-21 RX ORDER — METHYLERGONOVINE MALEATE 0.2 MG/ML
200 INJECTION INTRAVENOUS PRN
Status: DISCONTINUED | OUTPATIENT
Start: 2023-09-21 | End: 2023-09-22 | Stop reason: SDUPTHER

## 2023-09-21 RX ORDER — ACETAMINOPHEN 325 MG/1
650 TABLET ORAL EVERY 4 HOURS PRN
Status: DISCONTINUED | OUTPATIENT
Start: 2023-09-21 | End: 2023-09-22

## 2023-09-21 RX ORDER — ONDANSETRON 2 MG/ML
4 INJECTION INTRAMUSCULAR; INTRAVENOUS EVERY 6 HOURS PRN
Status: DISCONTINUED | OUTPATIENT
Start: 2023-09-21 | End: 2023-09-22

## 2023-09-21 RX ORDER — NALOXONE HYDROCHLORIDE 0.4 MG/ML
INJECTION, SOLUTION INTRAMUSCULAR; INTRAVENOUS; SUBCUTANEOUS PRN
Status: DISCONTINUED | OUTPATIENT
Start: 2023-09-21 | End: 2023-09-22

## 2023-09-21 RX ORDER — SODIUM CHLORIDE 9 MG/ML
INJECTION, SOLUTION INTRAVENOUS PRN
Status: DISCONTINUED | OUTPATIENT
Start: 2023-09-21 | End: 2023-09-22

## 2023-09-21 RX ORDER — SODIUM CHLORIDE, SODIUM LACTATE, POTASSIUM CHLORIDE, AND CALCIUM CHLORIDE .6; .31; .03; .02 G/100ML; G/100ML; G/100ML; G/100ML
500 INJECTION, SOLUTION INTRAVENOUS PRN
Status: DISCONTINUED | OUTPATIENT
Start: 2023-09-21 | End: 2023-09-22

## 2023-09-21 RX ORDER — MISOPROSTOL 100 UG/1
900 TABLET ORAL PRN
Status: DISCONTINUED | OUTPATIENT
Start: 2023-09-21 | End: 2023-09-23 | Stop reason: HOSPADM

## 2023-09-21 RX ORDER — CARBOPROST TROMETHAMINE 250 UG/ML
250 INJECTION, SOLUTION INTRAMUSCULAR PRN
Status: DISCONTINUED | OUTPATIENT
Start: 2023-09-21 | End: 2023-09-22 | Stop reason: SDUPTHER

## 2023-09-21 RX ORDER — EPHEDRINE SULFATE/0.9% NACL/PF 25 MG/5 ML
5 SYRINGE (ML) INTRAVENOUS EVERY 5 MIN PRN
Status: DISCONTINUED | OUTPATIENT
Start: 2023-09-21 | End: 2023-09-22

## 2023-09-21 RX ORDER — ROPIVACAINE HYDROCHLORIDE 2 MG/ML
10 INJECTION, SOLUTION EPIDURAL; INFILTRATION; PERINEURAL CONTINUOUS
Status: DISCONTINUED | OUTPATIENT
Start: 2023-09-21 | End: 2023-09-22

## 2023-09-21 RX ORDER — SODIUM CHLORIDE, SODIUM LACTATE, POTASSIUM CHLORIDE, AND CALCIUM CHLORIDE .6; .31; .03; .02 G/100ML; G/100ML; G/100ML; G/100ML
1000 INJECTION, SOLUTION INTRAVENOUS PRN
Status: DISCONTINUED | OUTPATIENT
Start: 2023-09-21 | End: 2023-09-22

## 2023-09-21 RX ORDER — ROPIVACAINE HYDROCHLORIDE 2 MG/ML
INJECTION, SOLUTION EPIDURAL; INFILTRATION; PERINEURAL CONTINUOUS PRN
Status: DISCONTINUED | OUTPATIENT
Start: 2023-09-21 | End: 2023-09-22 | Stop reason: SDUPTHER

## 2023-09-21 RX ORDER — SEVOFLURANE 250 ML/250ML
1 LIQUID RESPIRATORY (INHALATION) CONTINUOUS PRN
Status: DISCONTINUED | OUTPATIENT
Start: 2023-09-21 | End: 2023-09-22

## 2023-09-21 RX ORDER — SODIUM CHLORIDE 0.9 % (FLUSH) 0.9 %
5-40 SYRINGE (ML) INJECTION EVERY 12 HOURS SCHEDULED
Status: DISCONTINUED | OUTPATIENT
Start: 2023-09-21 | End: 2023-09-22

## 2023-09-21 RX ORDER — ROPIVACAINE HYDROCHLORIDE 2 MG/ML
INJECTION, SOLUTION EPIDURAL; INFILTRATION; PERINEURAL
Status: COMPLETED
Start: 2023-09-21 | End: 2023-09-21

## 2023-09-21 RX ORDER — BUTORPHANOL TARTRATE 1 MG/ML
1 INJECTION, SOLUTION INTRAMUSCULAR; INTRAVENOUS
Status: DISCONTINUED | OUTPATIENT
Start: 2023-09-21 | End: 2023-09-22

## 2023-09-21 RX ADMIN — ROPIVACAINE HYDROCHLORIDE 10 ML/HR: 2 INJECTION, SOLUTION EPIDURAL; INFILTRATION at 19:05

## 2023-09-21 RX ADMIN — Medication 1 MILLI-UNITS/MIN: at 15:04

## 2023-09-21 RX ADMIN — SODIUM CHLORIDE, POTASSIUM CHLORIDE, SODIUM LACTATE AND CALCIUM CHLORIDE 1000 ML: 600; 310; 30; 20 INJECTION, SOLUTION INTRAVENOUS at 18:23

## 2023-09-21 RX ADMIN — SODIUM CHLORIDE, POTASSIUM CHLORIDE, SODIUM LACTATE AND CALCIUM CHLORIDE: 600; 310; 30; 20 INJECTION, SOLUTION INTRAVENOUS at 15:02

## 2023-09-21 ASSESSMENT — PAIN DESCRIPTION - DESCRIPTORS
DESCRIPTORS: CRAMPING;ACHING
DESCRIPTORS: CRAMPING;ACHING
DESCRIPTORS: ACHING;CRAMPING
DESCRIPTORS: ACHING;CRAMPING
DESCRIPTORS: CRAMPING;ACHING
DESCRIPTORS: ACHING;CRAMPING
DESCRIPTORS: ACHING;CRAMPING
DESCRIPTORS: CRAMPING;ACHING
DESCRIPTORS: CRAMPING;ACHING

## 2023-09-21 NOTE — FLOWSHEET NOTE
Pt requesting epidural, fluid bolus started. [place] CNM notified of pt  request to have epidural before AROM. States ok to do this.

## 2023-09-21 NOTE — FLOWSHEET NOTE
Patient to room 200 per wheelchair by writer accompanied by significant other with concerns of contractions that started 2 hours ago that have become stronger and more frequent. Denies rupture of membranes and states frequent fetal movement. Oriented to room and call system and she verbalized understanding.

## 2023-09-21 NOTE — FLOWSHEET NOTE
David phillip called and notified that pt sve is unchanged. Pt does not want Pitocin. States ok to discharge pt to home. What Type Of Note Output Would You Prefer (Optional)?: Bullet Format How Severe Is Your Skin Lesion?: mild Has Your Skin Lesion Been Treated?: not been treated Is This A New Presentation, Or A Follow-Up?: Skin Lesion Additional History: Patient request a 2nd opinion TELE

## 2023-09-21 NOTE — FLOWSHEET NOTE
Pt states that she wants to stay at hospital and receive Pitocin for augmentation. Plan of care discussed.

## 2023-09-21 NOTE — FLOWSHEET NOTE
Pt states that she would like to think more about having pitocin for augmentation of labor. States she is worried to go home and not make it back to the hospital in time for delivery. States would like more time to discuss with S. O. Privacy provided. Pt denies questions.

## 2023-09-21 NOTE — DISCHARGE INSTRUCTIONS
Follow-up with your [facility] doctor as specified. [address] [facility] Department phone: [phone]    Dr. Suzan Davis [address] [place] [address]   [place]ia [phone]   or Flory Dahl [phone]     DIET  Eat a well balanced diet focusing on foods high in fiber and protein. Drink plenty of fluids especially water. To avoid constipation you may take a mild stool softener as recommended by your doctor or midwife. ACTIVITY  Gradually increase your activity. Resume exercise regimen only after advice by your doctor or midwife. Avoid lifting anything heavier than a gallon of milk for SIX weeks. Avoid driving until your doctor or midwife has given their approval.  [place] Area slowly from a lying to sitting and then a standing position. Climb stairs one at a time. Use caution when carrying your baby up and down the stairs. NO SEXUAL Activity for 4-6 weeks or until advised by your doctor; Nothing in vagina: intercourse, tampons, or douching. Be prepared to discuss family planning at your follow-up OB visit. You may feel tired or have a lack of energy. You may continue your prenatal vitamin to replenish nutrients post delivery. Nap when baby naps to catch up on sleep. EMOTIONS  You may feel marcsu, sad, teary, & overwhelmed. Contact your OB provider if you feel you may be showing signs of postpartum depression, or have thoughts of harming yourself or your infant. If infant will not stop crying, contact another adult for help or place infant in their crib on their back and take a break. NEVER shake your infant. BLEEDING  Vaginal bleeding will decrease in amount over the next few weeks. You will notice that as your activity increases, your flow may increase. This is your body's way of telling you, you need to take things easier and rest more often.   Call your care provider if you are saturating more than one maxi pad in an hour & resting Pat Nolan(PA)

## 2023-09-21 NOTE — FLOWSHEET NOTE
Plan of care with Pitocin discussed. Pt states that she does not want to have pitocin for induction. Alex learym called and notified of pt refusal. States to continue with heat packs in bra for nipple stimulation. Check pt after 2 hours and call her for update.

## 2023-09-21 NOTE — FLOWSHEET NOTE
Cherri Nguyen CNM called and notified that pt is deciding if she wants to stay or go home. States to let her know when pt decides.

## 2023-09-21 NOTE — ANESTHESIA PROCEDURE NOTES
Epidural Block    Patient location during procedure: OB  Start time: 9/21/2023 6:50 PM  End time: 9/21/2023 7:05 PM  Reason for block: labor epidural  Staffing  Performed: resident/CRNA   Resident/CRNA: DOROTHY Perez CRNA  Performed by: DOROTHY Perez CRNA  Authorized by: DOROTHY Perez CRNA    Epidural  Patient position: sitting  Prep: ChloraPrep and site prepped and draped  Patient monitoring: continuous pulse ox and frequent blood pressure checks  Approach: midline  Location: L3-4  Injection technique: JANINE saline  Guidance: anatomy guided.   Provider prep: mask and sterile gloves  Needle  Needle type: Tuohy   Needle gauge: 17 G  Needle length: 3.5 in  Needle insertion depth: 6 cm  Catheter type: side hole  Catheter size: 19 G  Catheter at skin depth: 12 cm  Test dose: negative  Kit: Fernandez Perifix  Lot number: [id number]  Expiration date: 11/30/2024Catheter Secured: tegaderm and tape  Assessment  Sensory level: T4  Hemodynamics: stable  Attempts: 1  Outcomes: uncomplicated and patient tolerated procedure well  Preanesthetic Checklist  Completed: patient identified, IV checked, site marked, risks and benefits discussed, surgical/procedural consents, equipment checked, pre-op evaluation, timeout performed, anesthesia consent given, oxygen available and monitors applied/VS acknowledged

## 2023-09-21 NOTE — PROGRESS NOTES
Priya Pepe CNM at bedside at this time for SVE. Pt now 4/70/-1. Per CNM, admit patient with regular labor orders. Pt may be intermittent monitoring and remain IV locked. CNM would like patient to be up and ambulating the halls as well. Pt updated on POC.

## 2023-09-22 PROCEDURE — 59400 OBSTETRICAL CARE: CPT | Performed by: ADVANCED PRACTICE MIDWIFE

## 2023-09-22 PROCEDURE — 10907ZC DRAINAGE OF AMNIOTIC FLUID, THERAPEUTIC FROM PRODUCTS OF CONCEPTION, VIA NATURAL OR ARTIFICIAL OPENING: ICD-10-PCS | Performed by: NURSE PRACTITIONER

## 2023-09-22 PROCEDURE — 7200000001 HC VAGINAL DELIVERY

## 2023-09-22 PROCEDURE — 2580000003 HC RX 258: Performed by: ADVANCED PRACTICE MIDWIFE

## 2023-09-22 PROCEDURE — 1220000000 HC SEMI PRIVATE OB R&B

## 2023-09-22 PROCEDURE — 51702 INSERT TEMP BLADDER CATH: CPT

## 2023-09-22 PROCEDURE — 6370000000 HC RX 637 (ALT 250 FOR IP): Performed by: ADVANCED PRACTICE MIDWIFE

## 2023-09-22 RX ORDER — MISOPROSTOL 100 UG/1
800 TABLET ORAL PRN
Status: DISCONTINUED | OUTPATIENT
Start: 2023-09-22 | End: 2023-09-23 | Stop reason: HOSPADM

## 2023-09-22 RX ORDER — SODIUM CHLORIDE 0.9 % (FLUSH) 0.9 %
5-40 SYRINGE (ML) INJECTION EVERY 12 HOURS SCHEDULED
Status: DISCONTINUED | OUTPATIENT
Start: 2023-09-22 | End: 2023-09-23 | Stop reason: HOSPADM

## 2023-09-22 RX ORDER — MISOPROSTOL 100 UG/1
200 TABLET ORAL PRN
Status: DISCONTINUED | OUTPATIENT
Start: 2023-09-22 | End: 2023-09-23 | Stop reason: HOSPADM

## 2023-09-22 RX ORDER — SODIUM CHLORIDE 9 MG/ML
INJECTION, SOLUTION INTRAVENOUS PRN
Status: DISCONTINUED | OUTPATIENT
Start: 2023-09-22 | End: 2023-09-23 | Stop reason: HOSPADM

## 2023-09-22 RX ORDER — SODIUM CHLORIDE 0.9 % (FLUSH) 0.9 %
5-40 SYRINGE (ML) INJECTION PRN
Status: DISCONTINUED | OUTPATIENT
Start: 2023-09-22 | End: 2023-09-23 | Stop reason: HOSPADM

## 2023-09-22 RX ORDER — METHYLERGONOVINE MALEATE 0.2 MG/ML
200 INJECTION INTRAVENOUS PRN
Status: DISCONTINUED | OUTPATIENT
Start: 2023-09-22 | End: 2023-09-23 | Stop reason: HOSPADM

## 2023-09-22 RX ORDER — ACETAMINOPHEN 500 MG
1000 TABLET ORAL EVERY 8 HOURS PRN
Status: DISCONTINUED | OUTPATIENT
Start: 2023-09-22 | End: 2023-09-23 | Stop reason: HOSPADM

## 2023-09-22 RX ORDER — IBUPROFEN 800 MG/1
800 TABLET ORAL EVERY 8 HOURS PRN
Status: DISCONTINUED | OUTPATIENT
Start: 2023-09-22 | End: 2023-09-23 | Stop reason: HOSPADM

## 2023-09-22 RX ORDER — DOCUSATE SODIUM 100 MG/1
100 CAPSULE, LIQUID FILLED ORAL 2 TIMES DAILY PRN
Status: DISCONTINUED | OUTPATIENT
Start: 2023-09-22 | End: 2023-09-23 | Stop reason: HOSPADM

## 2023-09-22 RX ORDER — MODIFIED LANOLIN
OINTMENT (GRAM) TOPICAL PRN
Status: DISCONTINUED | OUTPATIENT
Start: 2023-09-22 | End: 2023-09-23 | Stop reason: HOSPADM

## 2023-09-22 RX ORDER — CARBOPROST TROMETHAMINE 250 UG/ML
250 INJECTION, SOLUTION INTRAMUSCULAR PRN
Status: DISCONTINUED | OUTPATIENT
Start: 2023-09-22 | End: 2023-09-23 | Stop reason: HOSPADM

## 2023-09-22 RX ADMIN — IBUPROFEN 800 MG: 800 TABLET, FILM COATED ORAL at 05:27

## 2023-09-22 RX ADMIN — SODIUM CHLORIDE, PRESERVATIVE FREE 10 ML: 5 INJECTION INTRAVENOUS at 09:32

## 2023-09-22 ASSESSMENT — PAIN DESCRIPTION - LOCATION: LOCATION: ABDOMEN;BACK

## 2023-09-22 ASSESSMENT — PAIN SCALES - GENERAL: PAINLEVEL_OUTOF10: 6

## 2023-09-22 NOTE — PLAN OF CARE
Problem: Pain  Goal: Verbalizes/displays adequate comfort level or baseline comfort level  2023 by Harrison Penny RN  Outcome: Progressing  2023 by Anatoliy Bains RN  Outcome: Progressing     Problem: Vaginal Birth or  Section  Goal: Fetal and maternal status remain reassuring during the birth process  Description:  Birth OB-Pregnancy care plan goal which identifies if the fetal and maternal status remain reassuring during the birth process  2023 by Harrison Penny RN  Outcome: Progressing  2023 by Anatoliy Bains RN  Outcome: Progressing     Problem: Postpartum  Goal: Experiences normal postpartum course  Description:  Postpartum OB-Pregnancy care plan goal which identifies if the mother is experiencing a normal postpartum course  2023 by Harrison Penny RN  Outcome: Progressing  2023 by Anatoliy Bains RN  Outcome: Progressing  Goal: Appropriate maternal -  bonding  Description:  Postpartum OB-Pregnancy care plan goal which identifies if the mother and  are bonding appropriately  2023 by Harrison Penny RN  Outcome: Progressing  2023 by Anatoliy Bains RN  Outcome: Progressing  Goal: Establishment of infant feeding pattern  Description:  Postpartum OB-Pregnancy care plan goal which identifies if the mother is establishing a feeding pattern with their   2023 by Harrison Penny RN  Outcome: Progressing  2023 by Anatoliy Bains RN  Outcome: Progressing  Goal: Incisions, wounds, or drain sites healing without S/S of infection  2023 by Harrison Penny RN  Outcome: Progressing  2023 by Anatoliy Bains RN  Outcome: Progressing     Problem: Infection - Adult  Goal: Absence of infection at discharge  2023 by Harrison Penny RN  Outcome: Progressing  2023 by Anatoliy Bains RN  Outcome: Progressing  Goal: Absence of infection during

## 2023-09-22 NOTE — L&D DELIVERY NOTE
Margot Median [[id number]]      Labor Events     Labor: No   Steroids: None  Cervical Ripening Date/Time:      Antibiotics Received during Labor: No  Rupture Identifier: Sac 1  Rupture Date/Time:  23 22:26:00   Rupture Type: AROM, Intact  Fluid Color: Bloody Show  Fluid Odor: None  Fluid Volume: Large  Induction: None  Augmentation: AROM, Oxytocin  Labor Complications: None              Anesthesia    Method: Epidural       Labor Event Times      Labor onset date/time:        Dilation complete date/time:        Start pushing date/time:  2023 01:48:00   Decision date/time (emergent ):            Delivery Details      Delivery Date: 23 Delivery Time: 01:50:00   Delivery Type: Vaginal, Spontaneous               Presentation    Presentation: Vertex  _: Occiput  _: Anterior       Shoulder Dystocia    Shoulder Dystocia Present?: No                                                                                                           Assisted Delivery Details    Forceps Attempted?: No  Vacuum Extractor Attempted?: No                                                             Cord    Vessels: 3 Vessels  Complications: None  Delayed Cord Clamping?: Yes  Cord Clamped Date/Time: 2023 01:52:00  Cord Blood Disposition: Lab  Gases Sent?: No   Cord Comments:  PROCEDURAL - OBSTETRIC - CORD OBSERVATION   Cord Segment sent to lab for holding             Placenta    Date/Time: 2023 01:54:44  Removal: Spontaneous  Appearance: Intact  Disposition: Lab       Lacerations    Episiotomy: None  Perineal Lacerations: None  Other Lacerations: no non-perineal laceration       Vaginal Counts    Initial Count Personnel: Long Boone RN  Initial Count Verified By: Idalia Vieira LPN  Intial Sponge Count: Correct  Intial Instruments Count: Correct   Final Sponges Count: Correct  Final Instruments Count: Correct   Final Count Personnel: NAY MABRY CST  Final Count Verified By: Cherri Nguyen

## 2023-09-22 NOTE — ANESTHESIA POSTPROCEDURE EVALUATION
Department of Anesthesiology  Postprocedure Note    Patient: Flex Canales  MRN: [id number]  [id number]  Date of evaluation: 9/22/2023      Procedure Summary     Date: 09/21/23 Room / Location:     Anesthesia Start: 1850 Anesthesia Stop: 09/22/23 0150    Procedure: Labor Analgesia Diagnosis:     Scheduled Providers:  Responsible Provider: DOROTHY Brown CRNA    Anesthesia Type: epidural ASA Status: 2          Anesthesia Type: No value filed.     Marylou Phase I:      Marylou Phase II:        Anesthesia Post Evaluation    Patient location during evaluation: bedside  Patient participation: complete - patient participated  Level of consciousness: awake and alert  Pain score: 0  Airway patency: patent  Nausea & Vomiting: no nausea and no vomiting  Complications: no  Cardiovascular status: hemodynamically stable  Respiratory status: acceptable  Hydration status: stable  Multimodal analgesia pain management approach  Pain management: satisfactory to patient

## 2023-09-22 NOTE — H&P
-- DO NOT REPLY / DO NOT REPLY ALL --  -- Message is from the [facility]--    COVID-19 Universal Screening: Negative    General Patient Message      Reason for Call: Patient reporting that she is running late for IN-Clinic surgery today.  Patient stated that she should be at your office in 10 minutes.  Please call patient back for any other instructions. Thank you.     Caller Information       Type Contact Phone    07/09/2020 08:09 AM Phone (Incoming) Janie Birmingham (Self) [phone] (H)          Alternative phone number: none     Turnaround time given to caller:   \"This message will be sent to [state Provider's name]. The clinical team will fulfill your request as soon as they review your message.\"     Department of Obstetrics and Gynecology   Obstetrics History and Physical  Susy Blunt Section  H&P Admission Inpatient  Note        CHIEF COMPLAINT:  multip, 37+6 weeks gestation came in this am with regular painful contractions, initially made good cervical change from 2 to 4 cm dilated and was admitted. she then spaced out contractions but initially declined aumentation. After cervix progressed to 4-5 cm patient then decided on augmentation and pitocin was initiated. HISTORY OF PRESENT ILLNESS:      The patient is a 26 y.o. female at 38w9d. OB History          2    Para   1    Term   1            AB        Living   1         SAB        IAB        Ectopic        Molar        Multiple   0    Live Births   1            Patient presents with a chief complaint as above and is being admitted for latent labor    Estimated Due Date: Estimated Date of Delivery: 10/6/23    PRENATAL CARE:    Complicated by: none    PAST OB HISTORY:  OB History          2    Para   1    Term   1            AB        Living   1         SAB        IAB        Ectopic        Molar        Multiple   0    Live Births   1                Past Medical History:    History reviewed. No pertinent past medical history. Past Surgical History:    History reviewed. No pertinent surgical history. Allergies:  Patient has no known allergies.     Social History:    Social History     Socioeconomic History    Marital status: Single     Spouse name: Not on file    Number of children: Not on file    Years of education: Not on file    Highest education level: Not on file   Occupational History    Not on file   Tobacco Use    Smoking status: Never    Smokeless tobacco: Never   Vaping Use    Vaping Use: Never used   Substance and Sexual Activity    Alcohol use: Never    Drug use: Never    Sexual activity: Yes     Partners: Male     Birth control/protection: Injection   Other Topics Concern    Not on file   Social History Narrative

## 2023-09-22 NOTE — FLOWSHEET NOTE
Writer speaks with Erich Moon CNM at this time update given on patient most recent SVE. JEFFREY states she plans to head into unit at some point to perform ROM.

## 2023-09-22 NOTE — FLOWSHEET NOTE
Writer calls WILLI Lam for delivery. Nadira Danielson in house at this time, will head to [facility] room for delivery.

## 2023-09-23 VITALS
RESPIRATION RATE: 16 BRPM | OXYGEN SATURATION: 97 % | DIASTOLIC BLOOD PRESSURE: 72 MMHG | SYSTOLIC BLOOD PRESSURE: 123 MMHG | TEMPERATURE: 98 F | HEART RATE: 84 BPM

## 2023-09-23 PROCEDURE — 6370000000 HC RX 637 (ALT 250 FOR IP): Performed by: ADVANCED PRACTICE MIDWIFE

## 2023-09-23 RX ORDER — PSEUDOEPHEDRINE HCL 30 MG
100 TABLET ORAL 2 TIMES DAILY PRN
Qty: 30 CAPSULE | Refills: 1 | Status: SHIPPED | OUTPATIENT
Start: 2023-09-23

## 2023-09-23 RX ORDER — IBUPROFEN 800 MG/1
800 TABLET ORAL EVERY 8 HOURS PRN
Qty: 60 TABLET | Refills: 3 | Status: SHIPPED | OUTPATIENT
Start: 2023-09-23

## 2023-09-23 RX ADMIN — ACETAMINOPHEN 1000 MG: 500 TABLET ORAL at 07:39

## 2023-09-23 ASSESSMENT — PAIN DESCRIPTION - DESCRIPTORS: DESCRIPTORS: SORE

## 2023-09-23 ASSESSMENT — PAIN SCALES - GENERAL: PAINLEVEL_OUTOF10: 3

## 2023-09-23 ASSESSMENT — PAIN DESCRIPTION - LOCATION: LOCATION: BACK

## 2023-09-23 NOTE — DISCHARGE SUMMARY
Obstetric Discharge Summary    Reasons for Admission on 2023  6:22 AM  Onset of Labor    Prenatal Procedures  None    Intrapartum Procedures  Vaginal Delivery    Postpartum Procedures  None     Data  Information for the patient's :  Omkar Goodwin [[id number]]   female   Birth Weight: 6 lb 12 oz (3.062 kg)   Discharge With Mother  Complications: No    Discharge Diagnosis  Patient Active Problem List    Diagnosis Date Noted    Uterine contractions 2023    35 weeks gestation of pregnancy 2023    Normal delivery        Discharge Information  Current Discharge Medication List        START taking these medications    Details   ibuprofen (ADVIL;MOTRIN) 800 MG tablet Take 1 tablet by mouth every 8 hours as needed for Pain  Qty: 60 tablet, Refills: 3      docusate sodium (COLACE, DULCOLAX) 100 MG CAPS Take 100 mg by mouth 2 times daily as needed for Constipation  Qty: 30 capsule, Refills: 1           CONTINUE these medications which have NOT CHANGED    Details   ondansetron (ZOFRAN-ODT) 4 MG disintegrating tablet Take 1 tablet by mouth every 6 hours as needed for Nausea or Vomiting  Qty: 20 tablet, Refills: 0    Associated Diagnoses: Nausea and vomiting in pregnancy      Prenatal MV-Min-Fe Fum-FA-DHA (PRENATAL 1 PO) Take by mouth           STOP taking these medications       promethazine (PHENERGAN) 25 MG tablet Comments:   Reason for Stopping:               No discharge procedures on file. Discharge to: Home    Condition on discharge: Stable    Discharge Date: 2023      Ruthann Navarrete MBA, [facility]  2023

## 2023-09-23 NOTE — PLAN OF CARE
Problem: Pain  Goal: Verbalizes/displays adequate comfort level or baseline comfort level  2023 by Alexander Carey RN  Outcome: Adequate for Discharge  Flowsheets (Taken 2023 5015)  Verbalizes/displays adequate comfort level or baseline comfort level:   Encourage patient to monitor pain and request assistance   Assess pain using appropriate pain scale  2023 by Massiel Palumbo RN  Outcome: Progressing  Flowsheets (Taken 2023)  Verbalizes/displays adequate comfort level or baseline comfort level:   Encourage patient to monitor pain and request assistance   Assess pain using appropriate pain scale   Administer analgesics based on type and severity of pain and evaluate response   Implement non-pharmacological measures as appropriate and evaluate response   Consider cultural and social influences on pain and pain management     Problem: Vaginal Birth or  Section  Goal: Fetal and maternal status remain reassuring during the birth process  Description:  Birth OB-Pregnancy care plan goal which identifies if the fetal and maternal status remain reassuring during the birth process  2023 by Alexander Carey RN  Outcome: Adequate for Discharge  2023 by Massiel Palumbo RN  Outcome: Progressing     Problem: Postpartum  Goal: Experiences normal postpartum course  Description:  Postpartum OB-Pregnancy care plan goal which identifies if the mother is experiencing a normal postpartum course  2023 by Alexander Carey RN  Outcome: Adequate for Discharge  2023 by Massiel Palumbo RN  Outcome: Progressing  Goal: Appropriate maternal -  bonding  Description:  Postpartum OB-Pregnancy care plan goal which identifies if the mother and  are bonding appropriately  2023 by Alexander Carey RN  Outcome: Adequate for Discharge  2023 by Massiel Palumbo RN  Outcome: Progressing  Goal: Establishment of infant feeding pattern  Description:  Postpartum OB-Pregnancy care plan goal which identifies if the mother is establishing a feeding pattern with their   2023 4440 by Clifton Weeks RN  Outcome: Adequate for Discharge  2023 by Wilber Mitchell RN  Outcome: Progressing  Goal: Incisions, wounds, or drain sites healing without S/S of infection  2023 by Clifton Weeks RN  Outcome: Adequate for Discharge  2023 by Wilber Mitchell RN  Outcome: Progressing     Problem: Infection - Adult  Goal: Absence of infection at discharge  2023 by Clifton Weeks RN  Outcome: Adequate for Discharge  2023 by Wilber Mitchell RN  Outcome: Progressing  Goal: Absence of infection during hospitalization  2023 by Clifton Weeks RN  Outcome: Adequate for Discharge  2023 by Wilber Mitchell RN  Outcome: Progressing  Goal: Absence of fever/infection during anticipated neutropenic period  2023 by Clifton Weeks RN  Outcome: Adequate for Discharge  2023 by Wilber Mitchell RN  Outcome: Progressing     Problem: Safety - Adult  Goal: Free from fall injury  2023 by Clifton Weeks RN  Outcome: Adequate for Discharge  2023 by Wilber Mitchell RN  Outcome: Progressing     Problem: Discharge Planning  Goal: Discharge to home or other facility with appropriate resources  2023 by Clifton Weeks RN  Outcome: Adequate for Discharge  2023 by Wilber Mitchell RN  Outcome: Progressing     Problem: Chronic Conditions and Co-morbidities  Goal: Patient's chronic conditions and co-morbidity symptoms are monitored and maintained or improved  2023 by Clifton Weeks RN  Outcome: Adequate for Discharge  2023 by Wilber Mitchell RN  Outcome: Progressing

## 2023-09-23 NOTE — PROGRESS NOTES
Discharge instructions discussed with patient at this time, all issues and concerns addressed. Patient asked appropriate questions and answers provided. Patient denies any additional concerns at this time. Discharge paperwork signed. Patient given extra ky care items as requested. Informed to call when ready to leave department.

## 2023-09-23 NOTE — PROGRESS NOTES
Patient off unit in stable condition. Departure Mode: with significant other.     Mobility at Departure: ambulatory    Discharged to: private residence    Time of Discharge: 24-20-52-61

## 2023-10-03 ENCOUNTER — TELEPHONE (OUTPATIENT)
Dept: OBGYN | Age: 23
End: 2023-10-03

## 2023-10-03 NOTE — TELEPHONE ENCOUNTER
Post Partum Phone Call Vaginal delivery  Follow Up 2 weeks elsa after discharge      Date of service: 10/3/2023    Dorothy Strange  Is a 26 y.o. Delivery date 9/22/23    Type of delivery:  Spontaneous vaginal delivery    Laceration:No,     Infant gender: female    Infant name: Stephania Mcconnell    Are you breast or bottle feeding? formula    How did first pediatrician visit go: Good, looks great    How are you feeling: good    How is your bleeding: light spotting, sometimes more depending on activity. Any questions or concerns: none right now. Are you on any medications for depression. no    Information given to pt. EPPDS: 0      10/3/2023    11:28 AM   Postpartum Depression Scale   I have been able to laugh and see the funny side of things As much as I always could   I have looked forward with enjoyment to things As much as I ever did   I have blamed myself unnecessarily when things went wrong No, never   I have been anxious or worried for no good reason No, not at all   I have felt scared or panicky for no good reason No, not at all   I haven't been able to cope lately No, I have been coping as well as ever   I have been so unhappy that I have had difficulty sleeping Not at all   I have felt sad or miserable No, not at all   I have been so unhappy that I have been crying No, never   The thought of harming myself has occurred to me Never   Total 0       If above 10 schedule pt and appointment. Within a few days    Lets schedule your appt now -   Date - 11/2@ 0  What for -     History of thyroid issues? No  If yes order TSH with reflux to have done in lab within a week

## 2023-11-06 ENCOUNTER — POSTPARTUM VISIT (OUTPATIENT)
Dept: OBGYN | Age: 23
End: 2023-11-06
Payer: COMMERCIAL

## 2023-11-06 VITALS
DIASTOLIC BLOOD PRESSURE: 66 MMHG | BODY MASS INDEX: 19.98 KG/M2 | SYSTOLIC BLOOD PRESSURE: 112 MMHG | WEIGHT: 108.6 LBS | HEIGHT: 62 IN

## 2023-11-06 LAB — HGB, POC: 11.7

## 2023-11-06 PROCEDURE — 0503F POSTPARTUM CARE VISIT: CPT | Performed by: ADVANCED PRACTICE MIDWIFE

## 2023-11-06 PROCEDURE — 96372 THER/PROPH/DIAG INJ SC/IM: CPT | Performed by: ADVANCED PRACTICE MIDWIFE

## 2023-11-06 RX ORDER — MEDROXYPROGESTERONE ACETATE 150 MG/ML
150 INJECTION, SUSPENSION INTRAMUSCULAR ONCE
Status: COMPLETED | OUTPATIENT
Start: 2023-11-06 | End: 2023-11-06

## 2023-11-06 RX ADMIN — MEDROXYPROGESTERONE ACETATE 150 MG: 150 INJECTION, SUSPENSION INTRAMUSCULAR at 13:31

## 2023-11-06 NOTE — PROGRESS NOTES
never    Last pap date and results: never    Last HPV date and results: never    Chief Complaint   Patient presents with    Postpartum Care     Postpartum exam. Patient had vaginal delivery 09/22/2023. Discuss cycle control. How many Hours of sleep do you get a night: 5    Do you have a normal appetite: good    Any problems or pain: none    Do you feel like you coping well: fair    Is baby sleeping:good    How is baby eating: good    How did first pediatrician visit go: good    EPPDS:       11/6/2023    11:20 AM   Postpartum Depression Scale   I have been able to laugh and see the funny side of things Not quite so much now   I have looked forward with enjoyment to things As much as I ever did   I have blamed myself unnecessarily when things went wrong Yes, some of the time   I have been anxious or worried for no good reason Yes, sometimes   I have felt scared or panicky for no good reason Yes, sometimes   I haven't been able to cope lately Yes, sometimes I haven't been coping as well as usual   I have been so unhappy that I have had difficulty sleeping Not very often   I have felt sad or miserable Not very often   I have been so unhappy that I have been crying Only occasionally   The thought of harming myself has occurred to me Never   Total 12         No results found for this visit on 11/06/23. History of gestational diabetes? No  If yes order 2 hr GTT    Nurse: Catie    HPI:  PT presents for Post partum exam Follow up 6weeks after delivery. Objective   No acute distress  Excellent communications  Well-nourished     Assessment and Plan          Diagnosis Orders   1. Postpartum care and examination  POCT hemoglobin          is considering mirena iUD for cycle control      I am having Elisha Johnson maintain her Prenatal MV-Min-Fe Fum-FA-DHA (PRENATAL 1 PO), ondansetron, ibuprofen, and docusate. Return in about 12 weeks (around 1/29/2024) for depo.     There are no Patient Instructions on

## 2024-03-20 ENCOUNTER — TELEPHONE (OUTPATIENT)
Dept: OBGYN CLINIC | Age: 24
End: 2024-03-20

## 2024-03-20 NOTE — TELEPHONE ENCOUNTER
Patient has requested an appt with you in the [facility] office. As of 3/6/24 she has had 3 no shows in the [facility] office and 1 with you 9/15/22. I explained to the patient she has had 4 no shows between our offices so I will need permission to schedule an appt here. She voiced understanding. Please advise

## 2024-03-26 ENCOUNTER — OFFICE VISIT (OUTPATIENT)
Dept: OBGYN CLINIC | Age: 24
End: 2024-03-26
Payer: COMMERCIAL

## 2024-03-26 VITALS — SYSTOLIC BLOOD PRESSURE: 92 MMHG | WEIGHT: 100 LBS | DIASTOLIC BLOOD PRESSURE: 60 MMHG | BODY MASS INDEX: 18.29 KG/M2

## 2024-03-26 DIAGNOSIS — N93.9 ABNORMAL UTERINE AND VAGINAL BLEEDING, UNSPECIFIED: Primary | ICD-10-CM

## 2024-03-26 DIAGNOSIS — R10.2 PELVIC PAIN: ICD-10-CM

## 2024-03-26 PROCEDURE — G8419 CALC BMI OUT NRM PARAM NOF/U: HCPCS | Performed by: NURSE PRACTITIONER

## 2024-03-26 PROCEDURE — 4004F PT TOBACCO SCREEN RCVD TLK: CPT | Performed by: NURSE PRACTITIONER

## 2024-03-26 PROCEDURE — G8484 FLU IMMUNIZE NO ADMIN: HCPCS | Performed by: NURSE PRACTITIONER

## 2024-03-26 PROCEDURE — G8427 DOCREV CUR MEDS BY ELIG CLIN: HCPCS | Performed by: NURSE PRACTITIONER

## 2024-03-26 PROCEDURE — 99213 OFFICE O/P EST LOW 20 MIN: CPT | Performed by: NURSE PRACTITIONER

## 2024-04-01 ASSESSMENT — ENCOUNTER SYMPTOMS
RESPIRATORY NEGATIVE: 1
GASTROINTESTINAL NEGATIVE: 1

## 2024-04-11 ENCOUNTER — OFFICE VISIT (OUTPATIENT)
Dept: OBGYN CLINIC | Age: 24
End: 2024-04-11
Payer: COMMERCIAL

## 2024-04-11 ENCOUNTER — HOSPITAL ENCOUNTER (OUTPATIENT)
Age: 24
Setting detail: SPECIMEN
Discharge: HOME | End: 2024-04-11

## 2024-04-11 VITALS
WEIGHT: 100.2 LBS | BODY MASS INDEX: 18.44 KG/M2 | SYSTOLIC BLOOD PRESSURE: 80 MMHG | HEIGHT: 62 IN | DIASTOLIC BLOOD PRESSURE: 60 MMHG

## 2024-04-11 DIAGNOSIS — N83.201 RIGHT OVARIAN CYST: ICD-10-CM

## 2024-04-11 DIAGNOSIS — N93.9 ABNORMAL UTERINE AND VAGINAL BLEEDING, UNSPECIFIED: ICD-10-CM

## 2024-04-11 DIAGNOSIS — Z01.419 WOMEN'S ANNUAL ROUTINE GYNECOLOGICAL EXAMINATION: Primary | ICD-10-CM

## 2024-04-11 DIAGNOSIS — Z11.3 SCREENING FOR STD (SEXUALLY TRANSMITTED DISEASE): ICD-10-CM

## 2024-04-11 DIAGNOSIS — N94.89 PELVIC CONGESTION: ICD-10-CM

## 2024-04-11 DIAGNOSIS — Z12.4 PAP SMEAR FOR CERVICAL CANCER SCREENING: ICD-10-CM

## 2024-04-11 LAB
B-HCG SERPL EIA 3RD IS-ACNC: <0.2 MIU/ML (ref 0–7)
BASOPHILS # BLD: 0.06 K/UL (ref 0–0.2)
BASOPHILS NFR BLD: 2 % (ref 0–2)
EOSINOPHIL # BLD: 0.09 K/UL (ref 0–0.44)
EOSINOPHILS RELATIVE PERCENT: 2 % (ref 1–4)
ERYTHROCYTE [DISTWIDTH] IN BLOOD BY AUTOMATED COUNT: 12.7 % (ref 11.8–14.4)
HCT VFR BLD AUTO: 40.5 % (ref 36.3–47.1)
HGB BLD-MCNC: 13.1 G/DL (ref 11.9–15.1)
IMM GRANULOCYTES # BLD AUTO: <0.03 K/UL (ref 0–0.3)
IMM GRANULOCYTES NFR BLD: 0 %
LYMPHOCYTES NFR BLD: 1.36 K/UL (ref 1.1–3.7)
LYMPHOCYTES RELATIVE PERCENT: 35 % (ref 25–45)
MCH RBC QN AUTO: 28.6 PG (ref 25.2–33.5)
MCHC RBC AUTO-ENTMCNC: 32.3 G/DL (ref 28.4–34.8)
MCV RBC AUTO: 88.4 FL (ref 82.6–102.9)
MONOCYTES NFR BLD: 0.41 K/UL (ref 0.1–1.4)
MONOCYTES NFR BLD: 11 % (ref 2–8)
NEUTROPHILS NFR BLD: 50 % (ref 34–64)
NEUTS SEG NFR BLD: 1.95 K/UL (ref 1.5–8.1)
NRBC BLD-RTO: 0 PER 100 WBC
PLATELET # BLD AUTO: 243 K/UL (ref 138–453)
PMV BLD AUTO: 10.9 FL (ref 8.1–13.5)
RBC # BLD AUTO: 4.58 M/UL (ref 3.95–5.11)
TSH SERPL DL<=0.05 MIU/L-ACNC: 2.49 UIU/ML (ref 0.27–4.2)
WBC OTHER # BLD: 3.9 K/UL (ref 4.5–13.5)

## 2024-04-11 PROCEDURE — 99395 PREV VISIT EST AGE 18-39: CPT | Performed by: NURSE PRACTITIONER

## 2024-04-11 NOTE — PROGRESS NOTES
other day. Intensity ranges from mild-- 7/10 at its worst.      Review of Systems   Constitutional:  Negative for chills, fatigue and fever.   Respiratory:  Negative for shortness of breath.    Cardiovascular:  Negative for chest pain.   Gastrointestinal:  Negative for abdominal pain, constipation and diarrhea.   Genitourinary:  Positive for menstrual problem (AUB) and pelvic pain (RLQ). Negative for dysuria, enuresis, frequency, urgency and vaginal bleeding.   Neurological:  Negative for dizziness, light-headedness and headaches.       Physical Exam  Constitutional:       General: She is not in acute distress.     Appearance: Normal appearance. She is not ill-appearing.   Genitourinary:      Vulva, bladder and urethral meatus normal.      No lesions in the vagina.      Right Labia: No rash or lesions.     Left Labia: No lesions or rash.     No vaginal discharge.      No vaginal prolapse present.     No vaginal atrophy present.       Right Adnexa: not tender and no mass present.     Left Adnexa: not tender and no mass present.     No cervical motion tenderness, discharge or friability.      No parametrium nodularity present.     Uterus is not enlarged or tender.      No uterine mass detected.     No urethral prolapse, tenderness or mass present.   Breasts:     Right: No mass, nipple discharge, skin change or tenderness.      Left: No mass, nipple discharge, skin change or tenderness.   HENT:      Head: Normocephalic and atraumatic.   Eyes:      Extraocular Movements: Extraocular movements intact.      Pupils: Pupils are equal, round, and reactive to light.   Cardiovascular:      Rate and Rhythm: Normal rate.   Pulmonary:      Effort: Pulmonary effort is normal.   Abdominal:      Palpations: Abdomen is soft.      Tenderness: There is no abdominal tenderness. There is no guarding or rebound.   Musculoskeletal:         General: Normal range of motion.   Neurological:      General: No focal deficit present.      Mental

## 2024-04-12 DIAGNOSIS — Z11.3 SCREENING FOR STD (SEXUALLY TRANSMITTED DISEASE): ICD-10-CM

## 2024-04-12 LAB
C TRACH DNA SPEC QL PROBE+SIG AMP: NORMAL
N GONORRHOEA DNA SPEC QL PROBE+SIG AMP: NORMAL
SPECIMEN DESCRIPTION: NORMAL

## 2024-04-17 LAB
C TRACH DNA SPEC QL PROBE+SIG AMP: NEGATIVE
N GONORRHOEA DNA SPEC QL PROBE+SIG AMP: NEGATIVE
SPECIMEN DESCRIPTION: NORMAL

## 2024-04-24 LAB — CYTOLOGY REPORT: NORMAL

## 2024-07-16 ENCOUNTER — OFFICE VISIT (OUTPATIENT)
Dept: OBGYN CLINIC | Age: 24
End: 2024-07-16
Payer: COMMERCIAL

## 2024-07-16 ENCOUNTER — HOSPITAL ENCOUNTER (OUTPATIENT)
Age: 24
Setting detail: SPECIMEN
Discharge: HOME | End: 2024-07-16

## 2024-07-16 VITALS
WEIGHT: 101.2 LBS | BODY MASS INDEX: 18.62 KG/M2 | SYSTOLIC BLOOD PRESSURE: 106 MMHG | HEIGHT: 62 IN | DIASTOLIC BLOOD PRESSURE: 62 MMHG

## 2024-07-16 DIAGNOSIS — N94.89 PELVIC CONGESTION: Primary | ICD-10-CM

## 2024-07-16 DIAGNOSIS — N94.89 PELVIC CONGESTION: ICD-10-CM

## 2024-07-16 LAB — B-HCG SERPL EIA 3RD IS-ACNC: <0.2 MIU/ML (ref 0–7)

## 2024-07-16 PROCEDURE — 4004F PT TOBACCO SCREEN RCVD TLK: CPT | Performed by: NURSE PRACTITIONER

## 2024-07-16 PROCEDURE — G8427 DOCREV CUR MEDS BY ELIG CLIN: HCPCS | Performed by: NURSE PRACTITIONER

## 2024-07-16 PROCEDURE — 99213 OFFICE O/P EST LOW 20 MIN: CPT | Performed by: NURSE PRACTITIONER

## 2024-07-16 PROCEDURE — G8420 CALC BMI NORM PARAMETERS: HCPCS | Performed by: NURSE PRACTITIONER

## 2024-07-16 NOTE — PROGRESS NOTES
PROBLEM VISIT     Date of service: 2024    Elisha Valle  Is a 23 y.o. female    PT's PCP is: No primary care provider on file.     : 2000                                             Subjective:       Patient's last menstrual period was 2024.   OB History    Para Term  AB Living   2 2 2     2   SAB IAB Ectopic Molar Multiple Live Births           0 2      # Outcome Date GA Lbr Hugo/2nd Weight Sex Delivery Anes PTL Lv   2 Term 25 38w0d / 00:12 3.062 kg (6 lb 12 oz) F Vag-Spont EPI N [place]   1 Term 23 39w0d 02:14 / 00:41 2.968 kg (6 lb 8.7 oz) F Vag-Spont EPI N [place]        Social History     Tobacco Use   Smoking Status Some Days    Types: Cigarettes   Smokeless Tobacco Never        Social History     Substance and Sexual Activity   Alcohol Use Yes    Comment: occ       Allergies: Patient has no known allergies.      Current Outpatient Medications:     levonorgestrel-ethinyl estradiol (AVIANE) 0.1-20 MG-MCG per tablet, Take 1 tablet by mouth daily, Disp: 1 packet, Rfl: 9    Social History     Substance and Sexual Activity   Sexual Activity Yes    Partners: Male    Birth control/protection: Condom         Chief Complaint   Patient presents with    Follow-up     Follow up usn to recheck ovarian cyst. Reports still has intermittent pelvic pain but it is much better with OCP        PE:  Vital Signs  Blood pressure 106/62, height 1.575 m (5' 2\"), weight 45.9 kg (101 lb 3.2 oz), last menstrual period 2024, not currently breastfeeding.     HPI: Patient presents today following ultrasound to recheck right ovarian cyst. Reports since starting OCP her pelvic pain has been much improved and bleeding has returned to normal pattern. Denies any bothersome side effects of OCP, desires to continue with use. States she recently stopped taking OCP as she needed refills.     PT denies fever, chills, nausea and vomiting       Review of Systems   Constitutional: Negative.

## 2024-07-17 RX ORDER — LEVONORGESTREL/ETHIN.ESTRADIOL 0.1-0.02MG
1 TABLET ORAL DAILY
Qty: 1 PACKET | Refills: 9 | Status: SHIPPED | OUTPATIENT
Start: 2024-07-17 | End: 2024-07-17

## 2024-07-17 RX ORDER — LEVONORGESTREL/ETHIN.ESTRADIOL 0.1-0.02MG
1 TABLET ORAL DAILY
Qty: 1 PACKET | Refills: 9 | Status: SHIPPED | OUTPATIENT
Start: 2024-07-17

## 2024-07-17 ASSESSMENT — ENCOUNTER SYMPTOMS
GASTROINTESTINAL NEGATIVE: 1
RESPIRATORY NEGATIVE: 1

## 2025-07-02 ENCOUNTER — OFFICE VISIT (OUTPATIENT)
Dept: OBGYN CLINIC | Age: 25
End: 2025-07-02
Payer: COMMERCIAL

## 2025-07-02 ENCOUNTER — HOSPITAL ENCOUNTER (OUTPATIENT)
Age: 25
Setting detail: SPECIMEN
Discharge: HOME | End: 2025-07-02

## 2025-07-02 VITALS
BODY MASS INDEX: 17.92 KG/M2 | HEIGHT: 62 IN | DIASTOLIC BLOOD PRESSURE: 58 MMHG | SYSTOLIC BLOOD PRESSURE: 102 MMHG | WEIGHT: 97.4 LBS

## 2025-07-02 DIAGNOSIS — Z12.4 PAP SMEAR FOR CERVICAL CANCER SCREENING: ICD-10-CM

## 2025-07-02 DIAGNOSIS — Z01.419 WOMEN'S ANNUAL ROUTINE GYNECOLOGICAL EXAMINATION: Primary | ICD-10-CM

## 2025-07-02 PROCEDURE — 99395 PREV VISIT EST AGE 18-39: CPT | Performed by: NURSE PRACTITIONER

## 2025-07-02 ASSESSMENT — ENCOUNTER SYMPTOMS
DIARRHEA: 0
ABDOMINAL PAIN: 0
CONSTIPATION: 0
SHORTNESS OF BREATH: 0

## 2025-07-02 NOTE — PROGRESS NOTES
healthmaintenance per patients PCP.  - PAP SMEAR; Future    2. Pap smear for cervical cancer screening  - PAP SMEAR; Future          Return in about 1 year (around 7/2/2026) for yearly.     Electronically Signed by DOROTHY Brown NP

## 2025-07-16 LAB — CYTOLOGY REPORT: NORMAL

## 2025-07-18 LAB
HPV I/H RISK 4 DNA CVX QL NAA+PROBE: NOT DETECTED
HPV SAMPLE: NORMAL
HPV, INTERPRETATION: NORMAL
HPV16 DNA CVX QL NAA+PROBE: NOT DETECTED
HPV18 DNA CVX QL NAA+PROBE: NOT DETECTED
SPECIMEN DESCRIPTION: NORMAL